# Patient Record
Sex: FEMALE | Race: WHITE | Employment: FULL TIME | URBAN - METROPOLITAN AREA
[De-identification: names, ages, dates, MRNs, and addresses within clinical notes are randomized per-mention and may not be internally consistent; named-entity substitution may affect disease eponyms.]

---

## 2017-03-13 PROBLEM — J06.9 URI, ACUTE: Status: ACTIVE | Noted: 2017-03-13

## 2017-03-13 PROBLEM — J01.00 ACUTE MAXILLARY SINUSITIS: Status: ACTIVE | Noted: 2017-03-13

## 2017-03-14 ENCOUNTER — HOSPITAL ENCOUNTER (OUTPATIENT)
Dept: GENERAL RADIOLOGY | Age: 52
Discharge: HOME | End: 2017-03-14
Payer: COMMERCIAL

## 2017-03-14 DIAGNOSIS — J06.9 URI, ACUTE: ICD-10-CM

## 2017-03-14 PROCEDURE — 71020 XR CHEST PA LAT: CPT

## 2018-08-30 ENCOUNTER — HOSPITAL ENCOUNTER (OUTPATIENT)
Age: 53
Setting detail: OBSERVATION
Discharge: HOME | End: 2018-09-01
Attending: EMERGENCY MEDICINE | Admitting: INTERNAL MEDICINE
Payer: COMMERCIAL

## 2018-08-30 ENCOUNTER — APPOINTMENT (OUTPATIENT)
Dept: GENERAL RADIOLOGY | Age: 53
End: 2018-08-30
Attending: EMERGENCY MEDICINE
Payer: COMMERCIAL

## 2018-08-30 ENCOUNTER — APPOINTMENT (OUTPATIENT)
Dept: CT IMAGING | Age: 53
End: 2018-08-30
Attending: EMERGENCY MEDICINE
Payer: COMMERCIAL

## 2018-08-30 DIAGNOSIS — N30.00 ACUTE CYSTITIS WITHOUT HEMATURIA: ICD-10-CM

## 2018-08-30 DIAGNOSIS — G93.40 ACUTE ENCEPHALOPATHY: Primary | ICD-10-CM

## 2018-08-30 LAB
ALBUMIN SERPL-MCNC: 3.4 G/DL (ref 3.5–5)
ALBUMIN/GLOB SERPL: 1.1 {RATIO} (ref 1.2–3.5)
ALP SERPL-CCNC: 74 U/L (ref 50–136)
ALT SERPL-CCNC: 25 U/L (ref 12–65)
ANION GAP SERPL CALC-SCNC: 10 MMOL/L (ref 7–16)
AST SERPL-CCNC: 16 U/L (ref 15–37)
BASOPHILS # BLD: 0 K/UL (ref 0–0.2)
BASOPHILS NFR BLD: 0 % (ref 0–2)
BILIRUB SERPL-MCNC: 0.3 MG/DL (ref 0.2–1.1)
BUN SERPL-MCNC: 24 MG/DL (ref 6–23)
CALCIUM SERPL-MCNC: 8.3 MG/DL (ref 8.3–10.4)
CHLORIDE SERPL-SCNC: 111 MMOL/L (ref 98–107)
CO2 SERPL-SCNC: 24 MMOL/L (ref 21–32)
CREAT SERPL-MCNC: 0.67 MG/DL (ref 0.6–1)
DIFFERENTIAL METHOD BLD: ABNORMAL
EOSINOPHIL # BLD: 0 K/UL (ref 0–0.8)
EOSINOPHIL NFR BLD: 0 % (ref 0.5–7.8)
ERYTHROCYTE [DISTWIDTH] IN BLOOD BY AUTOMATED COUNT: 13.2 %
GLOBULIN SER CALC-MCNC: 3.2 G/DL (ref 2.3–3.5)
GLUCOSE SERPL-MCNC: 125 MG/DL (ref 65–100)
HCT VFR BLD AUTO: 42.8 % (ref 35.8–46.3)
HGB BLD-MCNC: 14.2 G/DL (ref 11.7–15.4)
IMM GRANULOCYTES # BLD: 0 K/UL (ref 0–0.5)
IMM GRANULOCYTES NFR BLD AUTO: 0 % (ref 0–5)
LACTATE BLD-SCNC: 0.9 MMOL/L (ref 0.5–1.9)
LIPASE SERPL-CCNC: 157 U/L (ref 73–393)
LYMPHOCYTES # BLD: 0.6 K/UL (ref 0.5–4.6)
LYMPHOCYTES NFR BLD: 11 % (ref 13–44)
MAGNESIUM SERPL-MCNC: 2.3 MG/DL (ref 1.8–2.4)
MCH RBC QN AUTO: 30.9 PG (ref 26.1–32.9)
MCHC RBC AUTO-ENTMCNC: 33.2 G/DL (ref 31.4–35)
MCV RBC AUTO: 93 FL (ref 79.6–97.8)
MONOCYTES # BLD: 0.2 K/UL (ref 0.1–1.3)
MONOCYTES NFR BLD: 4 % (ref 4–12)
NEUTS SEG # BLD: 4.9 K/UL (ref 1.7–8.2)
NEUTS SEG NFR BLD: 85 % (ref 43–78)
NRBC # BLD: 0 K/UL (ref 0–0.2)
PLATELET # BLD AUTO: 170 K/UL (ref 150–450)
PMV BLD AUTO: 10.3 FL (ref 9.4–12.3)
POTASSIUM SERPL-SCNC: 4.2 MMOL/L (ref 3.5–5.1)
PROCALCITONIN SERPL-MCNC: <0.1 NG/ML
PROT SERPL-MCNC: 6.6 G/DL (ref 6.3–8.2)
RBC # BLD AUTO: 4.6 M/UL (ref 4.05–5.2)
SODIUM SERPL-SCNC: 145 MMOL/L (ref 136–145)
TROPONIN I BLD-MCNC: 0 NG/ML (ref 0.02–0.05)
WBC # BLD AUTO: 5.7 K/UL (ref 4.3–11.1)

## 2018-08-30 PROCEDURE — 99285 EMERGENCY DEPT VISIT HI MDM: CPT | Performed by: EMERGENCY MEDICINE

## 2018-08-30 PROCEDURE — 80307 DRUG TEST PRSMV CHEM ANLYZR: CPT

## 2018-08-30 PROCEDURE — 83605 ASSAY OF LACTIC ACID: CPT

## 2018-08-30 PROCEDURE — 80053 COMPREHEN METABOLIC PANEL: CPT

## 2018-08-30 PROCEDURE — 84145 PROCALCITONIN (PCT): CPT

## 2018-08-30 PROCEDURE — 71046 X-RAY EXAM CHEST 2 VIEWS: CPT

## 2018-08-30 PROCEDURE — 85025 COMPLETE CBC W/AUTO DIFF WBC: CPT

## 2018-08-30 PROCEDURE — 87086 URINE CULTURE/COLONY COUNT: CPT

## 2018-08-30 PROCEDURE — 93005 ELECTROCARDIOGRAM TRACING: CPT | Performed by: INTERNAL MEDICINE

## 2018-08-30 PROCEDURE — 84484 ASSAY OF TROPONIN QUANT: CPT

## 2018-08-30 PROCEDURE — 83735 ASSAY OF MAGNESIUM: CPT

## 2018-08-30 PROCEDURE — 70450 CT HEAD/BRAIN W/O DYE: CPT

## 2018-08-30 PROCEDURE — 81003 URINALYSIS AUTO W/O SCOPE: CPT

## 2018-08-30 PROCEDURE — 83690 ASSAY OF LIPASE: CPT

## 2018-08-30 PROCEDURE — 81015 MICROSCOPIC EXAM OF URINE: CPT

## 2018-08-30 NOTE — IP AVS SNAPSHOT
[address] [facility] 
 
 
 [address] [facility] 322 [facility] 
[phone] Patient: Corky Campbell MRN: [id number] :1965 About your hospitalization You were admitted on:  2018 You last received care in the:  [facility] 7 MED SURG You were discharged on:  2018 Why you were hospitalized Your primary diagnosis was: Altered Mental Status Your diagnoses also included:  Depression, Vasovagal Near Syncope Follow-up Information Follow up With Details Comments Contact Info Renzo Vidal DO  please call on monday and schedule a hospital follow up to be seen in about a week. [address] [facility] 
[phone] Discharge Orders None A check marcellus indicates which time of day the medication should be taken. My Medications CONTINUE taking these medications Instructions Each Dose to Equal  
 Morning Noon Evening Bedtime  
 albuterol 90 mcg/actuation inhaler Commonly known as:  PROVENTIL HFA, VENTOLIN HFA, PROAIR HFA Your next dose is: Take on as needed schedule Take 1 Puff by inhalation every four (4) hours as needed for Wheezing. 1 Puff CLARITIN 10 mg tablet Generic drug:  loratadine Your next dose is: Take as directed Take 10 mg by mouth. 10 mg  
    
   
   
   
  
 ergocalciferol 50,000 unit capsule Commonly known as:  ERGOCALCIFEROL Your next dose is: Take as directed Take 1 Cap by mouth every seven (7) days. [address] Units  
    
   
   
   
  
 sertraline 100 mg tablet Commonly known as:  ZOLOFT Your next dose is: Take as directed Take 1 tablet twice daily Discharge Instructions Altered Mental Status: Care Instructions Your Care Instructions Altered mental status is a change in how well your brain is working.  As a result, you may be confused, be less alert than usual, or act in odd ways. This may include seeing or hearing things that aren't really there (hallucinations). A mental status change has many possible causes. For example, it may be the result of an infection, an imbalance of chemicals in the body, or a chronic disease such as diabetes or COPD. It can also be caused by things such as a head injury, taking certain medicines, or using alcohol or drugs. The doctor may do tests to look for the cause. These tests may include urine tests, blood tests, and imaging tests such as a CT scan. Sometimes a clear cause isn't found. But tests can help the doctor rule out a serious cause of your symptoms. A change in mental status can be scary. But mental status will often return to normal when the cause is treated. So it is important to get any follow-up testing or treatment the doctor has suggested. The doctor has checked you carefully, but problems can develop later. If you notice any problems or new symptoms, get medical treatment right away. Follow-up care is a key part of your treatment and safety. Be sure to make and go to all appointments, and call your doctor if you are having problems. It's also a good idea to know your test results and keep a list of the medicines you take. How can you care for yourself at home? · Be safe with medicines. Take your medicines exactly as prescribed. Call your doctor if you think you are having a problem with your medicine. · Have another adult stay with you until you are better. This can help keep you safe. Ask that person to watch for signs that your mental status is getting worse. When should you call for help? Call 911 anytime you think you may need emergency care. For example, call if: 
  · You passed out (lost consciousness).  
 Call your doctor now or seek immediate medical care if: 
  · Your mental status is getting worse.   · You have new symptoms, such as a fever, chills, or shortness of breath.  
  · You do not feel safe.  
 Watch closely for changes in your health, and be sure to contact your doctor if: 
  · You do not get better as expected. Where can you learn more? Go to http://filemon-vanessa.info/. Enter B818 in the search box to learn more about \"Altered Mental Status: Care Instructions. \" Current as of: October 9, 2017 Content Version: 11.7 © 8590-9902 [employer]. Care instructions adapted under license by [employer] (which disclaims liability or warranty for this information). If you have questions about a medical condition or this instruction, always ask your healthcare professional. [address] any warranty or liability for your use of this information. Lightheadedness or Faintness: Care Instructions Your Care Instructions Lightheadedness is a feeling that you are about to faint or \"pass out. \" You do not feel as if you or your surroundings are moving. It is different from vertigo, which is the feeling that you or things around you are spinning or tilting. Lightheadedness usually goes away or gets better when you lie down. If lightheadedness gets worse, it can lead to a fainting spell. It is common to feel lightheaded from time to time. Lightheadedness usually is not caused by a serious problem. It often is caused by a short-lasting drop in blood pressure and blood flow to your head that occurs when you get up too quickly from a seated or lying position. Follow-up care is a key part of your treatment and safety. Be sure to make and go to all appointments, and call your doctor if you are having problems. It's also a good idea to know your test results and keep a list of the medicines you take. How can you care for yourself at home? · Lie down for 1 or 2 minutes when you feel lightheaded.  After lying down, sit up slowly and remain sitting for 1 to 2 minutes before slowly standing up. · Avoid movements, positions, or activities that have made you lightheaded in the past. 
· Get plenty of rest, especially if you have a cold or flu, which can cause lightheadedness. · Make sure you drink plenty of fluids, especially if you have a fever or have been sweating. · Do not drive or put yourself and others in danger while you feel lightheaded. When should you call for help? Call 911 anytime you think you may need emergency care. For example, call if: 
  · You have symptoms of a stroke. These may include: 
¨ Sudden numbness, tingling, weakness, or loss of movement in your face, arm, or leg, especially on only one side of your body. ¨ Sudden vision changes. ¨ Sudden trouble speaking. ¨ Sudden confusion or trouble understanding simple statements. ¨ Sudden problems with walking or balance. ¨ A sudden, severe headache that is different from past headaches.  
  · You have symptoms of a heart attack. These may include: ¨ Chest pain or pressure, or a strange feeling in the chest. 
¨ Sweating. ¨ Shortness of breath. ¨ Nausea or vomiting. ¨ Pain, pressure, or a strange feeling in the back, neck, jaw, or upper belly or in one or both shoulders or arms. ¨ Lightheadedness or sudden weakness. ¨ A fast or irregular heartbeat. After you call 911, the  may tell you to chew 1 adult-strength or 2 to 4 low-dose aspirin. Wait for an ambulance. Do not try to drive yourself.  
 Watch closely for changes in your health, and be sure to contact your doctor if: 
  · Your lightheadedness gets worse or does not get better with home care. Where can you learn more? Go to http://filemon-vanessa.info/. Enter C595 in the search box to learn more about \"Lightheadedness or Faintness: Care Instructions. \" Current as of: November 20, 2017 Content Version: 11.7 © 7433-5536 Healthwise, [employer]. Care instructions adapted under license by [employer] (which disclaims liability or warranty for this information). If you have questions about a medical condition or this instruction, always ask your healthcare professional. [address] any warranty or liability for your use of this information. DISCHARGE SUMMARY from Nurse PATIENT INSTRUCTIONS: 
 
 
F-face looks uneven A-arms unable to move or move unevenly S-speech slurred or non-existent T-time-call 911 as soon as signs and symptoms begin-DO NOT go Back to bed or wait to see if you get better-TIME IS BRAIN. Warning Signs of HEART ATTACK Call 911 if you have these symptoms: 
? Chest discomfort. Most heart attacks involve discomfort in the center of the chest that lasts more than a few minutes, or that goes away and comes back. It can feel like uncomfortable pressure, squeezing, fullness, or pain. ? Discomfort in other areas of the upper body. Symptoms can include pain or discomfort in one or both arms, the back, neck, jaw, or stomach. ? Shortness of breath with or without chest discomfort. ? Other signs may include breaking out in a cold sweat, nausea, or lightheadedness. Don't wait more than five minutes to call [address]! Fast action can save your life. Calling 911 is almost always the fastest way to get lifesaving treatment. Emergency Medical Services staff can begin treatment when they arrive  up to an hour sooner than if someone gets to the hospital by car. The discharge information has been reviewed with the patient.   The patient verbalized understanding. Discharge medications reviewed with the patient and appropriate educational materials and side effects teaching were provided. ___________________________________________________________________________________________________________________________________ MyChart Announcement We are excited to announce that we are making your provider's discharge notes available to you in TweetUp. You will see these notes when they are completed and signed by the physician that discharged you from your recent hospital stay. If you have any questions or concerns about any information you see in unrivalt, please call the Health Information Department where you were seen or reach out to your Primary Care Provider for more information about your plan of care. Introducing [facility]! [facility] introduces TweetUp patient portal. Now you can access parts of your medical record, email your doctor's office, and request medication refills online. 1. In your internet browser, go to [email or web address] 2. Click on the First Time User? Click Here link in the Sign In box. You will see the New Member Sign Up page. 3. Enter your TweetUp Access Code exactly as it appears below. You will not need to use this code after youve completed the sign-up process. If you do not sign up before the expiration date, you must request a new code. · TweetUp Access Code: [email or web address] Expires: 11/8/2018 11:53 AM 
 
4. Enter the last four digits of your Social Security Number (xxxx) and Date of Birth (mm/dd/yyyy) as indicated and click Submit. You will be taken to the next sign-up page. 5. Create a unrivalt ID. This will be your TweetUp login ID and cannot be changed, so think of one that is secure and easy to remember. 6. Create a TweetUp password. You can change your password at any time. 7. Enter your Password Reset Question and Answer.  This can be used at a later time if you forget your password. 8. Enter your e-mail address. You will receive e-mail notification when new information is available in [address] 9. Click Sign Up. You can now view and download portions of your medical record. 10. Click the Download Summary menu link to download a portable copy of your medical information. If you have questions, please visit the Frequently Asked Questions section of the [employer]t website. Remember, Leads Direct is NOT to be used for urgent needs. For medical emergencies, dial 911. Now available from your iPhone and Android! Introducing Tay Costa As a [employer] patient, I wanted to make you aware of our electronic visit tool called Tay Costa. [employer] 24/7 allows you to connect within minutes with a medical provider 24 hours a day, seven days a week via a mobile device or tablet or logging into a secure website from your computer. You can access Tay Costa from anywhere in the [place]. A virtual visit might be right for you when you have a simple condition and feel like you just dont want to get out of bed, or cant get away from work for an appointment, when your regular [employer] provider is not available (evenings, weekends or holidays), or when youre out of town and need minor care. Electronic visits cost only $49 and if the [employer] 24/7 provider determines a prescription is needed to treat your condition, one can be electronically transmitted to a nearby pharmacy*. Please take a moment to enroll today if you have not already done so. The enrollment process is free and takes just a few minutes. To enroll, please download the [employer] 24/7 jessica to your tablet or phone, or visit [email or web address] to enroll on your computer.    
And, as an [address] [place] [address] patient with a BeOnDesk account, the results of your visits will be scanned into your electronic medical record and your primary care provider will be able to view the scanned results. We urge you to continue to see your regular [facility] provider for your ongoing medical care. And while your primary care provider may not be the one available when you seek a CallRestopjfin virtual visit, the peace of mind you get from getting a real diagnosis real time can be priceless. For more information on ValetAnywhere, view our Frequently Asked Questions (FAQs) at [email or web address]. Sincerely, 
 
Mireya Dowell MD 
Chief Medical Officer 50Gabriela Lawson *:  certain medications cannot be prescribed via ValetAnywhere Unresulted Labs-Please follow up with your PCP about these lab tests Order Current Status CULTURE, BLOOD Preliminary result CULTURE, BLOOD Preliminary result CULTURE, URINE Preliminary result Providers Seen During Your Hospitalization Provider Specialty Primary office phone Nguyễn Hays MD Emergency Medicine [phone] Tre Tyson MD Internal Medicine [phone] Your Primary Care Physician (PCP) Primary Care Physician Office Phone Office Fax Abeljose Jez [phone] You are allergic to the following No active allergies Recent Documentation Height Weight BMI OB Status Smoking Status 1.575 m 90.7 kg 36.58 kg/m2 Perimenopausal Never Smoker Emergency Contacts Name Discharge Info Relation Home Work Mobile Luigi Menard  Spouse [3] [phone] Patient Belongings The following personal items are in your possession at time of discharge: 
     Visual Aid: None Discharge Instructions Attachments/References VASOVAGAL SYNCOPE (ENGLISH) Patient Handouts Vasovagal Syncope: Care Instructions Your Care Instructions Vasovagal syncope (say \"yeh-per-WUG-gul MYGC-zhi-eht\")is sudden dizziness or fainting that can be set off by things such as pain, stress, fear, or trauma. You may sweat or feel lightheaded, sick to your stomach, or tingly. The problem causes the heart rate to slow and the blood vessels to widen, or dilate, for a short time. When this happens, blood pools in the lower body, and less blood goes to the brain. You can usually get relief by lying down with your legs raised (elevated). This helps more blood to flow to your brain and may help relieve symptoms like feeling dizzy. Some doctors may recommend a technique that involves tensing your fists and arms. This type of fainting is often easy to predict. For example, it happens to some people when they see blood or have to get a shot. They may feel symptoms before they faint. An episode of vasovagal syncope usually responds well to self-care. Other treatment often isn't needed. But if the fainting keeps happening, your doctor may suggest further treatments. Follow-up care is a key part of your treatment and safety. Be sure to make and go to all appointments, and call your doctor if you are having problems. It's also a good idea to know your test results and keep a list of the medicines you take. How can you care for yourself at home? · Drink plenty of fluids to prevent dehydration. If you have kidney, heart, or liver disease and have to limit fluids, talk with your doctor before you increase your fluid intake. · Try to avoid things that you think may set off vasovagal syncope. · Talk to your doctor about any medicines you take. Some medicines may increase the chance of this condition occurring. · If you feel symptoms, lie down with your legs raised. Talk to your doctor about what to do if your symptoms come back. When should you call for help? Call 911 anytime you think you may need emergency care. For example, call if: 
  · You have symptoms of a heart problem. These may include: ¨ Chest pain or pressure. ¨ Severe trouble breathing. ¨ A fast or irregular heartbeat.  
 Watch closely for changes in your health, and be sure to contact your doctor if: 
  · You have more episodes of fainting at home.  
  · You do not get better as expected. Where can you learn more? Go to http://filemon-vanessa.info/. Enter L754 in the search box to learn more about \"Vasovagal Syncope: Care Instructions. \" Current as of: November 20, 2017 Content Version: 11.7 © 8274-4089 Healthwise, Incorporated. Care instructions adapted under license by [employer] (which disclaims liability or warranty for this information). If you have questions about a medical condition or this instruction, always ask your healthcare professional. [address] any warranty or liability for your use of this information. Please provide this summary of care documentation to your next provider. Signatures-by signing, you are acknowledging that this After Visit Summary has been reviewed with you and you have received a copy. Patient Signature:  ____________________________________________________________ Date:  ____________________________________________________________  
  
Tammy Mora Provider Signature:  ____________________________________________________________ Date:  ____________________________________________________________

## 2018-08-31 ENCOUNTER — APPOINTMENT (OUTPATIENT)
Dept: MRI IMAGING | Age: 53
End: 2018-08-31
Attending: INTERNAL MEDICINE
Payer: COMMERCIAL

## 2018-08-31 ENCOUNTER — APPOINTMENT (OUTPATIENT)
Dept: ULTRASOUND IMAGING | Age: 53
End: 2018-08-31
Attending: INTERNAL MEDICINE
Payer: COMMERCIAL

## 2018-08-31 PROBLEM — R41.82 ALTERED MENTAL STATUS: Status: ACTIVE | Noted: 2018-08-31

## 2018-08-31 LAB
ALBUMIN SERPL-MCNC: 3.2 G/DL (ref 3.5–5)
ALBUMIN/GLOB SERPL: 1.1 {RATIO} (ref 1.2–3.5)
ALP SERPL-CCNC: 67 U/L (ref 50–136)
ALT SERPL-CCNC: 22 U/L (ref 12–65)
AMMONIA PLAS-SCNC: 35 UMOL/L (ref 11–32)
AMPHET UR QL SCN: NEGATIVE
ANION GAP SERPL CALC-SCNC: 10 MMOL/L (ref 7–16)
APPEARANCE UR: CLEAR
AST SERPL-CCNC: 11 U/L (ref 15–37)
ATRIAL RATE: 51 BPM
BACTERIA URNS QL MICRO: 0 /HPF
BARBITURATES UR QL SCN: NEGATIVE
BENZODIAZ UR QL: NEGATIVE
BILIRUB SERPL-MCNC: 0.3 MG/DL (ref 0.2–1.1)
BILIRUB UR QL: NEGATIVE
BUN SERPL-MCNC: 21 MG/DL (ref 6–23)
CALCIUM SERPL-MCNC: 8.3 MG/DL (ref 8.3–10.4)
CALCULATED P AXIS, ECG09: 55 DEGREES
CALCULATED R AXIS, ECG10: 21 DEGREES
CALCULATED T AXIS, ECG11: 39 DEGREES
CANNABINOIDS UR QL SCN: NEGATIVE
CASTS URNS QL MICRO: 0 /LPF
CHLORIDE SERPL-SCNC: 112 MMOL/L (ref 98–107)
CHOLEST SERPL-MCNC: 218 MG/DL
CO2 SERPL-SCNC: 24 MMOL/L (ref 21–32)
COCAINE UR QL SCN: NEGATIVE
COLOR UR: YELLOW
CREAT SERPL-MCNC: 0.49 MG/DL (ref 0.6–1)
CRYSTALS URNS QL MICRO: 0 /LPF
DIAGNOSIS, 93000: NORMAL
EPI CELLS #/AREA URNS HPF: 0 /HPF
EST. AVERAGE GLUCOSE BLD GHB EST-MCNC: 100 MG/DL
ETHANOL SERPL-MCNC: <3 MG/DL
FOLATE SERPL-MCNC: 29.5 NG/ML (ref 3.1–17.5)
GLOBULIN SER CALC-MCNC: 3 G/DL (ref 2.3–3.5)
GLUCOSE BLD STRIP.AUTO-MCNC: 77 MG/DL (ref 65–100)
GLUCOSE SERPL-MCNC: 97 MG/DL (ref 65–100)
GLUCOSE UR STRIP.AUTO-MCNC: NEGATIVE MG/DL
HBA1C MFR BLD: 5.1 % (ref 4.8–6)
HDLC SERPL-MCNC: 68 MG/DL (ref 40–60)
HDLC SERPL: 3.2 {RATIO}
HGB UR QL STRIP: NO GROWTH
KETONES UR QL STRIP.AUTO: ABNORMAL MG/DL
LDLC SERPL CALC-MCNC: 137.2 MG/DL
LEUKOCYTE ESTERASE UR QL STRIP.AUTO: ABNORMAL
LIPID PROFILE,FLP: ABNORMAL
METHADONE UR QL: NEGATIVE
MUCOUS THREADS URNS QL MICRO: 0 /LPF
NITRITE UR QL STRIP.AUTO: NEGATIVE
OPIATES UR QL: NEGATIVE
P-R INTERVAL, ECG05: 126 MS
PCP UR QL: NEGATIVE
PH UR STRIP: 5.5 [PH] (ref 5–9)
POTASSIUM SERPL-SCNC: 3.5 MMOL/L (ref 3.5–5.1)
PROT SERPL-MCNC: 6.2 G/DL (ref 6.3–8.2)
PROT UR STRIP-MCNC: NEGATIVE MG/DL
Q-T INTERVAL, ECG07: 482 MS
QRS DURATION, ECG06: 84 MS
QTC CALCULATION (BEZET), ECG08: 444 MS
RBC #/AREA URNS HPF: 0 /HPF
SODIUM SERPL-SCNC: 146 MMOL/L (ref 136–145)
SP GR UR REFRACTOMETRY: 1.03 (ref 1–1.02)
TRIGL SERPL-MCNC: 64 MG/DL (ref 35–150)
TSH SERPL DL<=0.005 MIU/L-ACNC: 0.32 UIU/ML (ref 0.36–3.74)
UROBILINOGEN UR QL STRIP.AUTO: 0.2 EU/DL (ref 0.2–1)
VENTRICULAR RATE, ECG03: 51 BPM
VIT B12 SERPL-MCNC: 536 PG/ML (ref 193–986)
VLDLC SERPL CALC-MCNC: 12.8 MG/DL (ref 6–23)
WBC URNS QL MICRO: NORMAL /HPF

## 2018-08-31 PROCEDURE — 87040 BLOOD CULTURE FOR BACTERIA: CPT

## 2018-08-31 PROCEDURE — 80061 LIPID PANEL: CPT

## 2018-08-31 PROCEDURE — 36415 COLL VENOUS BLD VENIPUNCTURE: CPT

## 2018-08-31 PROCEDURE — 82962 GLUCOSE BLOOD TEST: CPT

## 2018-08-31 PROCEDURE — 93306 TTE W/DOPPLER COMPLETE: CPT

## 2018-08-31 PROCEDURE — 82746 ASSAY OF FOLIC ACID SERUM: CPT

## 2018-08-31 PROCEDURE — 82607 VITAMIN B-12: CPT

## 2018-08-31 PROCEDURE — 80053 COMPREHEN METABOLIC PANEL: CPT

## 2018-08-31 PROCEDURE — 74011250637 HC RX REV CODE- 250/637: Performed by: INTERNAL MEDICINE

## 2018-08-31 PROCEDURE — 77030032490 HC SLV COMPR SCD KNE COVD -B

## 2018-08-31 PROCEDURE — 74011000258 HC RX REV CODE- 258: Performed by: EMERGENCY MEDICINE

## 2018-08-31 PROCEDURE — 84443 ASSAY THYROID STIM HORMONE: CPT

## 2018-08-31 PROCEDURE — 93880 EXTRACRANIAL BILAT STUDY: CPT

## 2018-08-31 PROCEDURE — 82140 ASSAY OF AMMONIA: CPT

## 2018-08-31 PROCEDURE — 70551 MRI BRAIN STEM W/O DYE: CPT

## 2018-08-31 PROCEDURE — 74011250636 HC RX REV CODE- 250/636: Performed by: INTERNAL MEDICINE

## 2018-08-31 PROCEDURE — 99218 HC RM OBSERVATION: CPT

## 2018-08-31 PROCEDURE — 96365 THER/PROPH/DIAG IV INF INIT: CPT | Performed by: EMERGENCY MEDICINE

## 2018-08-31 PROCEDURE — 97161 PT EVAL LOW COMPLEX 20 MIN: CPT

## 2018-08-31 PROCEDURE — 74011250636 HC RX REV CODE- 250/636: Performed by: EMERGENCY MEDICINE

## 2018-08-31 PROCEDURE — 74011000258 HC RX REV CODE- 258: Performed by: INTERNAL MEDICINE

## 2018-08-31 PROCEDURE — 77030020263 HC SOL INJ SOD CL0.9% LFCR 1000ML

## 2018-08-31 PROCEDURE — 83036 HEMOGLOBIN GLYCOSYLATED A1C: CPT

## 2018-08-31 RX ORDER — ASPIRIN 325 MG
325 TABLET ORAL DAILY
Status: DISCONTINUED | OUTPATIENT
Start: 2018-08-31 | End: 2018-09-01 | Stop reason: HOSPADM

## 2018-08-31 RX ORDER — SERTRALINE HYDROCHLORIDE 100 MG/1
100 TABLET, FILM COATED ORAL 2 TIMES DAILY
Status: DISCONTINUED | OUTPATIENT
Start: 2018-08-31 | End: 2018-09-01 | Stop reason: HOSPADM

## 2018-08-31 RX ORDER — SODIUM CHLORIDE 0.9 % (FLUSH) 0.9 %
5-10 SYRINGE (ML) INJECTION EVERY 8 HOURS
Status: DISCONTINUED | OUTPATIENT
Start: 2018-08-31 | End: 2018-09-01 | Stop reason: HOSPADM

## 2018-08-31 RX ORDER — HEPARIN SODIUM 5000 [USP'U]/ML
5000 INJECTION, SOLUTION INTRAVENOUS; SUBCUTANEOUS EVERY 8 HOURS
Status: DISCONTINUED | OUTPATIENT
Start: 2018-08-31 | End: 2018-09-01 | Stop reason: HOSPADM

## 2018-08-31 RX ORDER — CEFTRIAXONE 1 G/1
INJECTION, POWDER, FOR SOLUTION INTRAMUSCULAR; INTRAVENOUS
Status: ACTIVE
Start: 2018-08-31 | End: 2018-08-31

## 2018-08-31 RX ORDER — SODIUM CHLORIDE 9 MG/ML
125 INJECTION, SOLUTION INTRAVENOUS CONTINUOUS
Status: DISCONTINUED | OUTPATIENT
Start: 2018-08-31 | End: 2018-09-01 | Stop reason: HOSPADM

## 2018-08-31 RX ORDER — SODIUM CHLORIDE 0.9 % (FLUSH) 0.9 %
5-10 SYRINGE (ML) INJECTION AS NEEDED
Status: DISCONTINUED | OUTPATIENT
Start: 2018-08-31 | End: 2018-09-01 | Stop reason: HOSPADM

## 2018-08-31 RX ORDER — SODIUM CHLORIDE 900 MG/100ML
INJECTION INTRAVENOUS
Status: ACTIVE
Start: 2018-08-31 | End: 2018-08-31

## 2018-08-31 RX ORDER — ATORVASTATIN CALCIUM 40 MG/1
40 TABLET, FILM COATED ORAL
Status: DISCONTINUED | OUTPATIENT
Start: 2018-08-31 | End: 2018-09-01 | Stop reason: HOSPADM

## 2018-08-31 RX ORDER — ALBUTEROL SULFATE 0.83 MG/ML
2.5 SOLUTION RESPIRATORY (INHALATION)
Status: DISCONTINUED | OUTPATIENT
Start: 2018-08-31 | End: 2018-09-01 | Stop reason: HOSPADM

## 2018-08-31 RX ORDER — SERTRALINE HYDROCHLORIDE 100 MG/1
100 TABLET, FILM COATED ORAL DAILY
Status: DISCONTINUED | OUTPATIENT
Start: 2018-08-31 | End: 2018-08-31

## 2018-08-31 RX ADMIN — SODIUM CHLORIDE 125 ML/HR: 900 INJECTION, SOLUTION INTRAVENOUS at 21:22

## 2018-08-31 RX ADMIN — SODIUM CHLORIDE 125 ML/HR: 900 INJECTION, SOLUTION INTRAVENOUS at 05:42

## 2018-08-31 RX ADMIN — SERTRALINE HYDROCHLORIDE 100 MG: 100 TABLET ORAL at 09:59

## 2018-08-31 RX ADMIN — Medication 10 ML: at 05:42

## 2018-08-31 RX ADMIN — SERTRALINE HYDROCHLORIDE 100 MG: 100 TABLET ORAL at 21:21

## 2018-08-31 RX ADMIN — Medication 5 ML: at 14:00

## 2018-08-31 RX ADMIN — ATORVASTATIN CALCIUM 40 MG: 40 TABLET, FILM COATED ORAL at 21:21

## 2018-08-31 RX ADMIN — ASPIRIN 325 MG ORAL TABLET 325 MG: 325 PILL ORAL at 09:58

## 2018-08-31 RX ADMIN — CEFTRIAXONE 1 G: 1 INJECTION, POWDER, FOR SOLUTION INTRAMUSCULAR; INTRAVENOUS at 23:42

## 2018-08-31 RX ADMIN — HEPARIN SODIUM 5000 UNITS: 5000 INJECTION INTRAVENOUS; SUBCUTANEOUS at 05:42

## 2018-08-31 RX ADMIN — CEFTRIAXONE SODIUM 1 G: 1 INJECTION, POWDER, FOR SOLUTION INTRAMUSCULAR; INTRAVENOUS at 01:37

## 2018-08-31 NOTE — PROGRESS NOTES
PT Note: PT orders received/reviewed and evaluation attempted. Patient was off the floor. Evaluation will be re-attempted as schedule and patient's status allow. Thanks, Robert Gallegos, PT, DPT

## 2018-08-31 NOTE — INTERDISCIPLINARY ROUNDS
Interdisciplinary team rounds were held 8/31/2018 with the following team members:Care Management, Nursing, Occupational Therapy and Physician and the patient. Plan of care discussed. See clinical pathway and/or care plan for interventions and desired outcomes. DC today pending echo results.

## 2018-08-31 NOTE — PROGRESS NOTES
Problem: Falls - Risk of 
Goal: *Absence of Falls Document Mehnaz Cochran Fall Risk and appropriate interventions in the flowsheet. Outcome: Progressing Towards Goal 
Fall Risk Interventions: 
Mobility Interventions: Patient to call before getting OOB, PT Consult for mobility concerns Mentation Interventions: Adequate sleep, hydration, pain control Elimination Interventions: Call light in reach, Patient to call for help with toileting needs

## 2018-08-31 NOTE — PROGRESS NOTES
TRANSFER - IN REPORT: 
 
Verbal report received from Kamlesh Barbosa RN on Ly Lamas  being received from ED (unit) for routine progression of care Report consisted of patients Situation, Background, Assessment and  
Recommendations(SBAR). Information from the following report(s) SBAR, ED Summary, [facility] and Recent Results was reviewed with the receiving nurse. Opportunity for questions and clarification was provided. Assessment completed upon patients arrival to unit and care assumed.

## 2018-08-31 NOTE — PROGRESS NOTES
Problem: Falls - Risk of 
Goal: *Absence of Falls Document Cristina Chamorro Fall Risk and appropriate interventions in the flowsheet. Outcome: Progressing Towards Goal 
Fall Risk Interventions: 
Mobility Interventions: Patient to call before getting OOB, PT Consult for mobility concerns, PT Consult for assist device competence Mentation Interventions: Adequate sleep, hydration, pain control, Bed/chair exit alarm, Door open when patient unattended Elimination Interventions: Bed/chair exit alarm, Call light in reach

## 2018-08-31 NOTE — PROGRESS NOTES
Changed Zoloft 100 mg dose to Twice Daily as patient takes it at home. Ok to change order per Dr. Jessica Johnson.

## 2018-08-31 NOTE — ROUTINE PROCESS
TRANSFER - OUT REPORT: 
 
Verbal report given to Aisha Mcdonough RN on Kody Elkins  being transferred to [id number] for routine progression of care Report consisted of patients Situation, Background, Assessment and  
Recommendations(SBAR). Information from the following report(s) SBAR, ED Summary, MAR, Recent Results and Cardiac Rhythm Sinus bradycardia was reviewed with the receiving nurse. Lines:  
Peripheral IV 08/30/18 Left Antecubital (Active) Site Assessment Clean, dry, & intact 8/30/2018 10:26 PM  
Dressing Status Clean, dry, & intact 8/30/2018 10:26 PM  
  
 
Opportunity for questions and clarification was provided. Patient transported with: 
 [employer]

## 2018-08-31 NOTE — PROGRESS NOTES
Jeri Link visited with patient. Prashanth Tracy,  Staff  C: [phone]  /  [email or web address]

## 2018-08-31 NOTE — H&P
Hospitalist H&P/Consult Note Admit Date:  2018 10:24 PM  
Name:  Davide Rogers Age:  52 y.o. 
:  1965 MRN:  [id number] PCP:  Kimberli Beltran DO Treatment Team: Attending Provider: Trevon Miranda MD; Primary Nurse: Caity Manley RN 
 
HPI:  
52years old F with PMH of depression, anxiety was brought to the ED by her  due to AMS at work. Patient noted she works at [employer], around 2:00 PM she was very busy. Patient was sweating a lot and having dizziness. Patient described dizziness as lightheadedness worse when she was trying to stand up from her desk. Patient also reported having nausea and vomiting several times. Patient noted by the end of the day she was having generalized weakness and she was unable to stand up by herself.  stated she was confused and weak at work.  noted she improved while in the the ED. Patient denies diarrhea, abdominal pain, dysuria, history of vertigo, focal weakness, SOB. In the ED CT brain did not reveal any acute disease. UA with few WBC. 10 systems reviewed and negative except as noted in HPI. Past Medical History:  
Diagnosis Date  Contact dermatitis and other eczema, due to unspecified cause  Depression Past Surgical History:  
Procedure Laterality Date  HX  SECTION    
 HX TONSIL AND ADENOIDECTOMY  HX TUBAL LIGATION    
 HX WISDOM TEETH EXTRACTION Prior to Admission Medications Prescriptions Last Dose Informant Patient Reported? Taking? albuterol (PROVENTIL HFA, VENTOLIN HFA, PROAIR HFA) 90 mcg/actuation inhaler   No No  
Sig: Take 1 Puff by inhalation every four (4) hours as needed for Wheezing.  
ergocalciferol (ERGOCALCIFEROL) 50,000 unit capsule   No No  
Sig: Take 1 Cap by mouth every seven (7) days. loratadine (CLARITIN) 10 mg tablet   Yes No  
Sig: Take 10 mg by mouth. sertraline (ZOLOFT) 100 mg tablet   No No  
Sig: Take 1 tablet twice daily Facility-Administered Medications: None No Known Allergies Social History Substance Use Topics  Smoking status: Never Smoker  Smokeless tobacco: Never Used  Alcohol use 0.0 oz/week  
  0 Standard drinks or equivalent per week Comment: occ Family History Problem Relation Age of Onset  Cancer Mother  Alzheimer Father Immunization History Administered Date(s) Administered  Influenza Vaccine 10/22/2017 Objective:  
 
Patient Vitals for the past 24 hrs: 
 Temp Pulse Resp BP SpO2  
08/31/18 0202 97.8 °F (36.6 °C) - - 118/69 95 % 08/31/18 0122 - - - 110/63 95 % 08/31/18 0102 - - - 134/73 95 % 08/31/18 0022 - - - 111/68 94 % 08/31/18 0002 - - - 118/70 94 % 08/30/18 2342 - - - 129/79 95 % 08/30/18 2322 - - - 123/76 98 % 08/30/18 2246 - (!) 56 - 108/74 97 % 08/30/18 2237 - (!) 58 - 121/78 99 % 08/30/18 2227 - 63 16 119/76 100 % Oxygen Therapy O2 Sat (%): 95 % (08/31/18 0202) Pulse via Oximetry: 59 beats per minute (08/31/18 0202) No intake or output data in the 24 hours ending 08/31/18 0207 Physical Exam: 
General:    Well nourished. Alert. Eyes:   Normal sclera. Extraocular movements intact. ENT:  Normocephalic, atraumatic. Dry mucous membranes CV:   RRR. No murmur, rub, or gallop. Lungs:  CTAB. No wheezing, rhonchi, or rales. Abdomen: Soft, nontender, nondistended. Bowel sounds normal.  
Extremities: Warm and dry. No cyanosis or edema. Neurologic: CN II-XII grossly intact. Sensation intact. Strength 5/5 upper and lower extremities. AAO x3. Finger to nose test negative Skin:     No rashes or jaundice. No wounds. Psych:  Normal mood and affect. I reviewed the labs  and other studies done this admission. Data Review:  
Recent Results (from the past 24 hour(s)) CBC WITH AUTOMATED DIFF Collection Time: 08/30/18 10:43 PM  
Result Value Ref Range WBC 5.7 4.3 - 11.1 K/uL  
 RBC 4.60 4.05 - 5.2 M/uL HGB 14.2 11.7 - 15.4 g/dL HCT 42.8 35.8 - 46.3 % MCV 93.0 79.6 - 97.8 FL  
 MCH 30.9 26.1 - 32.9 PG  
 MCHC 33.2 31.4 - 35.0 g/dL  
 RDW 13.2 % PLATELET 080 630 - 117 K/uL MPV 10.3 9.4 - 12.3 FL ABSOLUTE NRBC 0.00 0.0 - 0.2 K/uL  
 DF AUTOMATED NEUTROPHILS 85 (H) 43 - 78 % LYMPHOCYTES 11 (L) 13 - 44 % MONOCYTES 4 4.0 - 12.0 % EOSINOPHILS 0 (L) 0.5 - 7.8 % BASOPHILS 0 0.0 - 2.0 % IMMATURE GRANULOCYTES 0 0.0 - 5.0 %  
 ABS. NEUTROPHILS 4.9 1.7 - 8.2 K/UL  
 ABS. LYMPHOCYTES 0.6 0.5 - 4.6 K/UL  
 ABS. MONOCYTES 0.2 0.1 - 1.3 K/UL  
 ABS. EOSINOPHILS 0.0 0.0 - 0.8 K/UL  
 ABS. BASOPHILS 0.0 0.0 - 0.2 K/UL  
 ABS. IMM. GRANS. 0.0 0.0 - 0.5 K/UL METABOLIC PANEL, COMPREHENSIVE Collection Time: 08/30/18 10:43 PM  
Result Value Ref Range Sodium 145 136 - 145 mmol/L Potassium 4.2 3.5 - 5.1 mmol/L Chloride 111 (H) 98 - 107 mmol/L  
 CO2 24 21 - 32 mmol/L Anion gap 10 7 - 16 mmol/L Glucose 125 (H) 65 - 100 mg/dL BUN 24 (H) 6 - 23 MG/DL Creatinine 0.67 0.6 - 1.0 MG/DL  
 GFR est AA >60 >60 ml/min/1.73m2 GFR est non-AA >60 >60 ml/min/1.73m2 Calcium 8.3 8.3 - 10.4 MG/DL Bilirubin, total 0.3 0.2 - 1.1 MG/DL  
 ALT (SGPT) 25 12 - 65 U/L  
 AST (SGOT) 16 15 - 37 U/L Alk. phosphatase 74 50 - 136 U/L Protein, total 6.6 6.3 - 8.2 g/dL Albumin 3.4 (L) 3.5 - 5.0 g/dL Globulin 3.2 2.3 - 3.5 g/dL A-G Ratio 1.1 (L) 1.2 - 3.5 PROCALCITONIN Collection Time: 08/30/18 10:43 PM  
Result Value Ref Range Procalcitonin <0.1 ng/mL ETHYL ALCOHOL Collection Time: 08/30/18 10:43 PM  
Result Value Ref Range ALCOHOL(ETHYL),SERUM <3 MG/DL  
LIPASE Collection Time: 08/30/18 10:43 PM  
Result Value Ref Range Lipase 157 73 - 393 U/L MAGNESIUM Collection Time: 08/30/18 10:43 PM  
Result Value Ref Range Magnesium 2.3 1.8 - 2.4 mg/dL POC TROPONIN-I Collection Time: 08/30/18 10:48 PM  
Result Value Ref Range Troponin-I (POC) 0 (L) 0.02 - 0.05 ng/ml POC LACTIC ACID Collection Time: 08/30/18 10:50 PM  
Result Value Ref Range Lactic Acid (POC) 0.9 0.5 - 1.9 mmol/L  
URINALYSIS W/ RFLX MICROSCOPIC Collection Time: 08/30/18 11:34 PM  
Result Value Ref Range Color YELLOW Appearance CLEAR Specific gravity 1.028 (H) 1.001 - 1.023    
 pH (UA) 5.5 5.0 - 9.0 Protein NEGATIVE  NEG mg/dL Glucose NEGATIVE  mg/dL Ketone TRACE (A) NEG mg/dL Bilirubin NEGATIVE  NEG Blood NO GROWTH (A) NEG Urobilinogen 0.2 0.2 - 1.0 EU/dL Nitrites NEGATIVE  NEG Leukocyte Esterase MODERATE (A) NEG    
DRUG SCREEN, URINE Collection Time: 08/30/18 11:34 PM  
Result Value Ref Range PCP(PHENCYCLIDINE) NEGATIVE BENZODIAZEPINES NEGATIVE     
 COCAINE NEGATIVE AMPHETAMINES NEGATIVE METHADONE NEGATIVE     
 THC (TH-CANNABINOL) NEGATIVE     
 OPIATES NEGATIVE     
 BARBITURATES NEGATIVE URINE MICROSCOPIC Collection Time: 08/30/18 11:34 PM  
Result Value Ref Range WBC 10-20 0 /hpf  
 RBC 0 0 /hpf Epithelial cells 0 0 /hpf Bacteria 0 0 /hpf Casts 0 0 /lpf Crystals, urine 0 0 /LPF Mucus 0 0 /lpf Imaging Studies: CXR Results  (Last 48 hours) 08/30/18 2309  XR CHEST PA LAT Final result Impression:  IMPRESSION: Normal chest.  
   
   
   
   
  
 Narrative:  EXAM:  XR CHEST PA LAT INDICATION:   cp  
   
COMPARISON: 3/14/2017. FINDINGS: PA and lateral radiographs of the chest demonstrate clear lungs. The  
cardiac and mediastinal contours and pulmonary vascularity are normal.  The  
bones and soft tissues are within normal limits. CT Results  (Last 48 hours) 08/30/18 2301  CT HEAD WO CONT Final result Impression:  IMPRESSION:  
   
Unremarkable brain CT without intravenous contrast.  
   
Date of Dictation: 8/30/2018 11:02 PM  
   
  
 Narrative:  CT HEAD WITHOUT CONTRAST HISTORY:  Transient alteration of awareness. COMPARISON: None. TECHNIQUE: Axial imaging was performed without intravenous contrast utilizing 5mm slice thickness. Sagittal and coronal reformats were performed. Radiation  
dose reduction techniques were used for this study. Our CT scanner uses one or  
all of the following: Automated exposure control, adjustment of the MAS or KUB according to patient's  
size and iterative reconstruction. FINDINGS:      
   
*BRAIN:   
   -  There are no early signs of territorial or lacunar infarction by CT.  
   -  No intracranial mass, hematoma, or hydrocephalus.   
   -  No gross white matter abnormality by CT.  
   
*VISUALIZED PARANASAL SINUSES: Well aerated. *MASTOIDS:  Clear. *CALVARIUM AND SCALP: Unremarkable. Assessment and Plan:  
 
Hospital Problems as of 8/31/2018  Date Reviewed: 12/18/2017 Codes Class Noted - Resolved POA Altered mental status ICD-10-CM: R41.82 
ICD-9-CM: 780.97  8/31/2018 - Present Unknown Depression ICD-10-CM: F32.9 ICD-9-CM: 232  9/15/2015 - Present Yes A/P: 
 
-Altered mental status Could be secondary to dehydration vs ?stroke vs UTI Patient clinically improving at ED 
CT brain negative No WBC elevation, afebrile. Few WBC on UA Plan MRI Brain/echo and carotid US for stroke work up Monitor in telemetry Start ASA/statins Check orthostatics PT eval 
Check TSH, folic acid and R00 
IVF for hydration 
 
-UTI Start Rocephin Follow ucx 
 
-Depression Stable Restart home meds DVT ppx :heparin Code status: Full Estimated LOS:  1-2 nights Signed: Felix Bennett MD

## 2018-08-31 NOTE — ED TRIAGE NOTES
EMS states \"Patient has been in the past a little tired in the afternoon and takes a nap around 4 or 5. Patients  found her sleeping at her desk around 5:30 and every time she moves she is throwing up. Race score is 0. Patient is lethargic\"

## 2018-08-31 NOTE — PROGRESS NOTES
Pt seen and examined at bedside.  present. Dizziness/lightheaded at work yesterday, more fatigued than usual. No syncope/LOC; ? Slurred speech. Treated for UTI but asymptomatic. Stroke w/u unrevealing so far. Echo pending. Patient and  both report she's back to her baseline. If echo unremarkable will discharge home.

## 2018-08-31 NOTE — PROGRESS NOTES
08/31/18 1509 Dual Skin Pressure Injury Assessment Dual Skin Pressure Injury Assessment WDL Second Care Provider (Based on [address] [place] [address]) Sheryle Clara, RN Skin Integumentary Skin Integumentary (WDL) WDL Wound Prevention and Protection Methods Orientation of Wound Prevention Posterior Location of Wound Prevention Sacrum/Coccyx Dressing Present  No  
Wound Offloading (Prevention Methods) Bed, pressure reduction mattress;Repositioning;Pillows Skin dry and intact; no redness or breakdown noted. Heels and sacrum intact. No redness or open areas noted. Will continue to monitor for changes.

## 2018-08-31 NOTE — ED PROVIDER NOTES
HPI Comments: Presents with complaint of altered mental status. Patient was found by her  sleeping at her desk after having episodes of nausea and vomiting. He reports every time he tried to move her she would throw up. She denies any  Dizzy feeling just need to throw up every time she moved. She's been slow to answer questions not  Responding appropriately. She complains of pain in both her shoulders and some shortness of breath. She is falling asleep on talking to her. This is never happened before. She went to urgent care and they sent her here. She states both of her shoulders hurt. Patient is a 52 y.o. female presenting with lethargy, dizziness, and vomiting. The history is provided by the spouse and the patient. Lethargy This is a new problem. Episode onset: . The problem occurs constantly. The problem has not changed since onset. Associated symptoms include shortness of breath. Pertinent negatives include no chest pain, no abdominal pain and no headaches. The symptoms are aggravated by exertion. Nothing relieves the symptoms. Dizziness Associated symptoms include shortness of breath and vomiting. Pertinent negatives include no chest pain and no headaches. Vomiting Pertinent negatives include no abdominal pain, no headaches and no headaches. Past Medical History:  
Diagnosis Date  Contact dermatitis and other eczema, due to unspecified cause  Depression Past Surgical History:  
Procedure Laterality Date  HX  SECTION    
 HX TONSIL AND ADENOIDECTOMY  HX TUBAL LIGATION    
 HX WISDOM TEETH EXTRACTION Family History:  
Problem Relation Age of Onset  Cancer Mother  Alzheimer Father Social History Social History  Marital status:  Spouse name: N/A  
 Number of children: N/A  
 Years of education: N/A Occupational History  Not on file. Social History Main Topics  Smoking status: Never Smoker  Smokeless tobacco: Never Used  Alcohol use 0.0 oz/week  
  0 Standard drinks or equivalent per week Comment: occ  Drug use: No  
 Sexual activity: Not on file Other Topics Concern  Not on file Social History Narrative ALLERGIES: Review of patient's allergies indicates no known allergies. Review of Systems Unable to perform ROS: Mental status change Respiratory: Positive for shortness of breath. Cardiovascular: Negative for chest pain. Gastrointestinal: Positive for vomiting. Negative for abdominal pain. Neurological: Positive for dizziness. Negative for headaches. Vitals:  
 08/31/18 0002 08/31/18 0022 08/31/18 0102 08/31/18 0122 BP: 118/70 111/68 134/73 110/63 Pulse:      
Resp:      
SpO2: 94% 94% 95% 95% Weight:      
Height:      
      
 
Physical Exam  
Constitutional: She appears well-developed and well-nourished. She appears lethargic. She appears distressed (seems confused). HENT:  
Head: Normocephalic and atraumatic. Eyes: Conjunctivae and EOM are normal. Pupils are equal, round, and reactive to light. No nystagmus Neck: Normal range of motion. Neck supple. Cardiovascular: Normal rate and regular rhythm. Pulmonary/Chest: Effort normal and breath sounds normal. No respiratory distress. She has no wheezes. She has no rales. Abdominal: Soft. She exhibits no distension. There is no tenderness. There is no rebound and no guarding. Musculoskeletal: Normal range of motion. She exhibits no edema or tenderness. Neurological: She appears lethargic. She is disoriented. No cranial nerve deficit. Coordination normal.  
Skin: Skin is warm and dry. No rash noted. She is not diaphoretic. No erythema. Psychiatric: Her mood appears not anxious. She is slowed. She is inattentive. Nursing note and vitals reviewed. MDM Number of Diagnoses or Management Options Acute cystitis without hematuria:  
 Acute encephalopathy:  
Diagnosis management comments: EKG shows normal sinus rhythm with no ST elevation. Unclear what occurred earlier as she denied dizziness at this persistent nausea and vomiting and lethargic afterwards. She has a UTI. Admitted to the hospital for observation and IV antibiotics and reevaluation in the morning. Amount and/or Complexity of Data Reviewed Clinical lab tests: ordered and reviewed Discuss the patient with other providers: yes Independent visualization of images, tracings, or specimens: yes Risk of Complications, Morbidity, and/or Mortality Presenting problems: high Diagnostic procedures: moderate Management options: high Patient Progress Patient progress: stable ED Course Procedures

## 2018-08-31 NOTE — PROGRESS NOTES
Problem: Mobility Impaired (Adult and Pediatric) Goal: *Acute Goals and Plan of Care (Insert Text) LTG: 
(1.)Ms. Gianna Corey will move from supine to sit and sit to supine , scoot up and down and roll side to side in bed with INDEPENDENCE within 7 treatment day(s). (2.)Ms. Gianna Corey will transfer from bed to chair and chair to bed with MODIFIED INDEPENDENCE using the least restrictive device within 7 treatment day(s). (3.)Ms. Gianna Corey will ambulate with MODIFIED INDEPENDENCE for 500 feet with the least restrictive device within 7 treatment day(s). ________________________________________________________________________________________________ PHYSICAL THERAPY: Initial Assessment, PM 8/31/2018 OBSERVATION: Hospital Day: 2 Payor: BLUE [place]OSS [place] / [place] / Product Type: PPO /  
  
NAME/AGE/GENDER: Guy Heredia is a 52 y.o. female PRIMARY DIAGNOSIS: Altered mental status Altered mental status Altered mental status ICD-10: Treatment Diagnosis:  
 · Generalized Muscle Weakness (M62.81) · Other abnormalities of gait and mobility (R26.89) Precaution/Allergies: 
Review of patient's allergies indicates no known allergies. ASSESSMENT:  
 
Ms. Gianna Corey is a 52 y.o. female in the hospital for the above who was supine in bed upon arrival.  Pt reports that she lives in a two story house with her  that has 1 steps to enter. Pt also reported that PTA she was independent with ADLs and ambulated with independence. Pt admitted to no recent falls in the past year. Ms. Gianna Corey presents to PT with [facility] AROM and slight weakness strength in B LEs. Pt also presents with [facility] PROM and normal tone. Pt reported intact sensation in B LEs as well and demonstrated WFL B LE coordination. During evaluation pt performed STS with CGA and demonstrated fair to good standing balance.   While ambulating pt required Tony for loss of balance before getting back to bed. Ms. Destiny Rosario could benefit from skilled PT as she is currently functioning slightly below her baseline. This section established at most recent assessment PROBLEM LIST (Impairments causing functional limitations): 1. Decreased Strength 2. Decreased Ambulation Ability/Technique 3. Decreased Balance INTERVENTIONS PLANNED: (Benefits and precautions of physical therapy have been discussed with the patient.) 1. Balance Exercise 2. Bed Mobility 3. Family Education 4. Gait Training 5. Therapeutic Activites 6. Therapeutic Exercise/Strengthening 7. Transfer Training 8. Group Therapy TREATMENT PLAN: Frequency/Duration: 3 times a week for duration of hospital stay Rehabilitation Potential For Stated Goals: Good RECOMMENDED REHABILITATION/EQUIPMENT: (at time of discharge pending progress): Due to the probability of continued deficits (see above) this patient will not likely need continued skilled physical therapy after discharge. Equipment:  
? None at this time HISTORY:  
History of Present Injury/Illness (Reason for Referral): 
See H&P Past Medical History/Comorbidities: Ms. Destiny Rosario  has a past medical history of Contact dermatitis and other eczema, due to unspecified cause and Depression. Ms. Destiny Rosario  has a past surgical history that includes hx tonsil and adenoidectomy; hx wisdom teeth extraction; hx  section; and hx tubal ligation. Social History/Living Environment:  
Home Environment: Private residence # Steps to Enter: 1 One/Two Story Residence: Two story, live on 1st floor Living Alone: No 
Support Systems: Spouse/Significant Other/Partner Patient Expects to be Discharged to[de-identified] Unknown Current DME Used/Available at Home: None Prior Level of Function/Work/Activity: 
Pt reported that she lives in a two story house with her  and PTA pt was independent with ADLs and ambulation. Number of Personal Factors/Comorbidities that affect the Plan of Care: 1-2: MODERATE COMPLEXITY EXAMINATION:  
Most Recent Physical Functioning:  
Gross Assessment: 
AROM: Within functional limits PROM: Within functional limits Strength: Generally decreased, functional (L hip flexion 4/5) Coordination: Within functional limits Tone: Normal 
Sensation: Intact Posture: 
  
Balance: 
Sitting: Intact Standing: Impaired Standing - Static: Good Standing - Dynamic : Fair Bed Mobility: 
Supine to Sit: Independent Sit to Supine: Modified independent Wheelchair Mobility: 
  
Transfers: 
Sit to Stand: Stand-by assistance Stand to Sit: Stand-by assistance Gait: 
  
Speed/Yoly: Slow Gait Abnormalities: Trunk sway increased Distance (ft): 75 Feet (ft) Assistive Device: Other (comment) (HHA) Ambulation - Level of Assistance: Contact guard assistance;Minimal assistance Body Structures Involved: 1. Muscles Body Functions Affected: 1. Neuromusculoskeletal 
2. Movement Related Activities and Participation Affected: 1. Mobility 2. Community, Social and [place] Number of elements that affect the Plan of Care: 4+: HIGH COMPLEXITY CLINICAL PRESENTATION:  
Presentation: Stable and uncomplicated: LOW COMPLEXITY CLINICAL DECISION MAKIN[address] [place] [address]-[facility] 6 Clicks Basic Mobility Inpatient Short Form How much difficulty does the patient currently have. .. Unable A Lot A Little None 1. Turning over in bed (including adjusting bedclothes, sheets and blankets)? [] 1   [] 2   [] 3   [x] 4  
2. Sitting down on and standing up from a chair with arms ( e.g., wheelchair, bedside commode, etc.)   [] 1   [] 2   [] 3   [x] 4  
3. Moving from lying on back to sitting on the side of the bed? [] 1   [] 2   [] 3   [x] 4 How much help from another person does the patient currently need. .. Total A Lot A Little None 4. Moving to and from a bed to a chair (including a wheelchair)? [] 1   [] 2   [x] 3   [] 4  
5. Need to walk in hospital room? [] 1   [] 2   [x] 3   [] 4  
6. Climbing 3-5 steps with a railing? [] 1   [] 2   [x] 3   [] 4  
© 2007, Trustees of [address] [place] [address] [address] [address], under license to [employer]. All rights reserved Score:  Initial: 21 Most Recent: X (Date: -- ) Interpretation of Tool:  Represents activities that are increasingly more difficult (i.e. Bed mobility, Transfers, Gait). Score 24 23 22-20 19-15 14-10 9-7 6 Modifier CH CI CJ CK CL CM CN   
 
? Mobility - Walking and Moving Around:  
  - CURRENT STATUS: CJ - 20%-39% impaired, limited or restricted  - GOAL STATUS: CI - 1%-19% impaired, limited or restricted  - D/C STATUS:  ---------------To be determined--------------- Payor: [address] [place] [address] / Plan[place] / Product Type: PPO /   
 
Medical Necessity:    
· Patient demonstrates good rehab potential due to higher previous functional level. Reason for Services/Other Comments: 
· Patient continues to require skilled intervention due to decreased balance and ambulation. Use of outcome tool(s) and clinical judgement create a POC that gives a: Clear prediction of patient's progress: LOW COMPLEXITY  
  
 
 
 
TREATMENT:  
(In addition to Assessment/Re-Assessment sessions the following treatments were rendered) Pre-treatment Symptoms/Complaints:  None Pain: Initial:  
Pain Intensity 1: 0  Post Session:  0 Assessment/Reassessment only, no treatment provided today Braces/Orthotics/Lines/Etc:  
· O2 Device: Room air Treatment/Session Assessment:   
· Response to Treatment:  Tolerated without complaints. · Interdisciplinary Collaboration:  
o Physical Therapist 
o Registered Nurse · After treatment position/precautions:  
o Supine in bed 
o Bed/Chair-wheels locked 
o Bed in low position 
o Family at bedside · Compliance with Program/Exercises: Will assess as treatment progresses. · Recommendations/Intent for next treatment session: \"Next visit will focus on advancements to more challenging activities and reduction in assistance provided\". Total Treatment Duration: PT Patient Time In/Time Out Time In: 7711 Time Out: 4158 Ivette May, PT, DPT

## 2018-09-01 VITALS
OXYGEN SATURATION: 97 % | DIASTOLIC BLOOD PRESSURE: 84 MMHG | TEMPERATURE: 97.8 F | HEART RATE: 69 BPM | SYSTOLIC BLOOD PRESSURE: 122 MMHG | WEIGHT: 200 LBS | RESPIRATION RATE: 18 BRPM | BODY MASS INDEX: 36.8 KG/M2 | HEIGHT: 62 IN

## 2018-09-01 PROBLEM — R55 VASOVAGAL NEAR SYNCOPE: Status: ACTIVE | Noted: 2018-09-01

## 2018-09-01 PROBLEM — R55 VASOVAGAL NEAR SYNCOPE: Status: RESOLVED | Noted: 2018-09-01 | Resolved: 2018-09-01

## 2018-09-01 PROBLEM — R41.82 ALTERED MENTAL STATUS: Status: RESOLVED | Noted: 2018-08-31 | Resolved: 2018-09-01

## 2018-09-01 LAB
BASOPHILS # BLD: 0 K/UL (ref 0–0.2)
BASOPHILS NFR BLD: 1 % (ref 0–2)
DIFFERENTIAL METHOD BLD: ABNORMAL
EOSINOPHIL # BLD: 0.1 K/UL (ref 0–0.8)
EOSINOPHIL NFR BLD: 1 % (ref 0.5–7.8)
ERYTHROCYTE [DISTWIDTH] IN BLOOD BY AUTOMATED COUNT: 13.3 %
HCT VFR BLD AUTO: 41.2 % (ref 35.8–46.3)
HGB BLD-MCNC: 13.3 G/DL (ref 11.7–15.4)
IMM GRANULOCYTES # BLD: 0 K/UL (ref 0–0.5)
IMM GRANULOCYTES NFR BLD AUTO: 0 % (ref 0–5)
LYMPHOCYTES # BLD: 1.4 K/UL (ref 0.5–4.6)
LYMPHOCYTES NFR BLD: 35 % (ref 13–44)
MCH RBC QN AUTO: 30.4 PG (ref 26.1–32.9)
MCHC RBC AUTO-ENTMCNC: 32.3 G/DL (ref 31.4–35)
MCV RBC AUTO: 94.3 FL (ref 79.6–97.8)
MONOCYTES # BLD: 0.3 K/UL (ref 0.1–1.3)
MONOCYTES NFR BLD: 8 % (ref 4–12)
NEUTS SEG # BLD: 2.1 K/UL (ref 1.7–8.2)
NEUTS SEG NFR BLD: 55 % (ref 43–78)
NRBC # BLD: 0 K/UL (ref 0–0.2)
PLATELET # BLD AUTO: 155 K/UL (ref 150–450)
PMV BLD AUTO: 10 FL (ref 9.4–12.3)
RBC # BLD AUTO: 4.37 M/UL (ref 4.05–5.2)
WBC # BLD AUTO: 3.9 K/UL (ref 4.3–11.1)

## 2018-09-01 PROCEDURE — 99218 HC RM OBSERVATION: CPT

## 2018-09-01 PROCEDURE — 74011250637 HC RX REV CODE- 250/637: Performed by: INTERNAL MEDICINE

## 2018-09-01 PROCEDURE — 85025 COMPLETE CBC W/AUTO DIFF WBC: CPT

## 2018-09-01 PROCEDURE — 36415 COLL VENOUS BLD VENIPUNCTURE: CPT

## 2018-09-01 RX ADMIN — ASPIRIN 325 MG ORAL TABLET 325 MG: 325 PILL ORAL at 08:50

## 2018-09-01 RX ADMIN — SERTRALINE HYDROCHLORIDE 100 MG: 100 TABLET ORAL at 08:50

## 2018-09-01 NOTE — DISCHARGE SUMMARY
Hospitalist Discharge Summary     Admit Date:  2018 10:24 PM   Name:  Michele Amor   Age:  52 y.o.  :  1965   MRN:  [id number]   PCP:  Jennifer Navarro DO  Treatment Team: Attending Provider: Kelechi Bruno MD; Hospitalist: Bouchra Whipple MD; Primary Nurse: June Valencia; Utilization Review: Zaire Cyr    Problem List for this Hospitalization:  Hospital Problems as of 2018  Date Reviewed: 2017          Codes Class Noted - Resolved POA    Vasovagal near syncope ICD-10-CM: R55  ICD-9-CM: 780.2  2018 - Present Yes        * (Principal)Altered mental status ICD-10-CM: R41.82  ICD-9-CM: 780.97  2018 - Present Yes        Depression ICD-10-CM: F32.9  ICD-9-CM: 263  9/15/2015 - Present Yes                Admission HPI from 2018:    52years old F with PMH of depression, anxiety was brought to the ED by her  due to AMS at work. Patient noted she works at [employer], around 2:00 PM she was very busy. Patient was sweating a lot and having dizziness. Patient described dizziness as lightheadedness worse when she was trying to stand up from her desk. Patient also reported having nausea and vomiting several times. Patient noted by the end of the day she was having generalized weakness and she was unable to stand up by herself.  stated she was confused and weak at work.  noted she improved while in the the ED. Patient denies diarrhea, abdominal pain, dysuria, history of vertigo, focal weakness, SOB.     In the ED CT brain did not reveal any acute disease. UA with few WBC. Hospital Course:  51 y/o WF with MDD/anxiety admitted on  with confusion and a near syncopal episode. 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 She became flushed, diaphoretic and lightheaded with near-syncope. She felt weak.  She underwent CVA work up while in the hospital which included head CT, MRI, carotid dopplers and echocardiogram, all of which were unremarkable. A1C and lipids were normal. She was given ceftriaxone for a possible UTI, although she is asymptomatic. Patient's event is very consistent with vasovagal near-syncope due to prolonged standing and she has no evidence of stroke or cardiac disease. She does not have an indication for statin therapy. She has improved during her stay and will be discharged home with her regular medications. Follow up instructions and discharge meds at bottom of this note. Plan was discussed with patient. All questions answered. Her hospital course was otherwise unremarkable and she is stable at time of discharge. Diagnostic Imaging/Tests:   Xr Chest Pa Lat    Result Date: 8/30/2018  EXAM:  XR CHEST PA LAT INDICATION:   cp COMPARISON: 3/14/2017. FINDINGS: PA and lateral radiographs of the chest demonstrate clear lungs. The cardiac and mediastinal contours and pulmonary vascularity are normal.  The bones and soft tissues are within normal limits. IMPRESSION: Normal chest.     Mri Brain Wo Cont    Result Date: 8/31/2018  MRI BRAIN WITHOUT CONTRAST 8/31/2018 HISTORY: Dizziness. Lightheadedness. Nausea and vomiting. Generalized weakness. TECHNIQUE: Sagittal and axial T1-weighted, axial T2-weighted, axial and coronal FLAIR, axial T2-weighted gradient-echo, axial diffusion weighted images with ADC maps of the brain. COMPARISON: Head CT 8/30/2018 FINDINGS: There is no acute infarction, acute intracranial hemorrhage, intra-axial mass, hydrocephalus, or abnormal extra-axial fluid collection. On the T2-weighted and FLAIR sequences, there are a few punctate hyperintense foci within the frontal white matter and gomez. This is not an uncommon finding which may be present with asymptomatic patients, with migraine headaches or with mild chronic small vessel ischemic disease. Mild mucosal thickening is present in the right maxillary sinus and ethmoid air cells.  There is no hydrocephalus, intra-axial mass, or abnormal extra-axial fluid collection. IMPRESSION: 1. No acute infarction. 2. Nonspecific white matter findings present as described. This pattern may be present with chronic small vessel ischemic disease or migraine headaches. Ct Head Wo Cont    Result Date: 8/30/2018  CT HEAD WITHOUT CONTRAST HISTORY:  Transient alteration of awareness. COMPARISON: None. TECHNIQUE: Axial imaging was performed without intravenous contrast utilizing 5mm slice thickness. Sagittal and coronal reformats were performed. Radiation dose reduction techniques were used for this study. Our CT scanner uses one or all of the following: Automated exposure control, adjustment of the MAS or KUB according to patient's size and iterative reconstruction. FINDINGS:    *BRAIN:    -  There are no early signs of territorial or lacunar infarction by CT.    -  No intracranial mass, hematoma, or hydrocephalus.    -  No gross white matter abnormality by CT. *VISUALIZED PARANASAL SINUSES: Well aerated. *MASTOIDS:  Clear. *CALVARIUM AND SCALP: Unremarkable. IMPRESSION: Unremarkable brain CT without intravenous contrast. Date of Dictation: 8/30/2018 11:02 PM     Duplex Carotid Bilateral    Result Date: 8/31/2018  Examination: Carotid ultrasound. Comparison: None. Indication: CVA Findings: Color and power Doppler imaging as well as spectral analysis of the bilateral extracranial carotid systems were performed. On the right, there is no significant plaque. There is no hemodynamically significant stenosis. The vertebral artery has antegrade flow. Peak systolic velocities are as follows: CCA 77; ICA 83; ECA 74 cm/s. ICA to CCA ratio 1.1. On the left, there is no significant plaque. There is no hemodynamically significant stenosis. The vertebral artery has antegrade flow. Peak systolic velocities are as follows: CCA 94; ; ECA 81 cm/s. ICA/CCA ratio 1.1. Impression: 1.  No significant plaque involving the bilateral extracranial carotid arterial systems, with no hemodynamically significant stenosis. Echocardiogram results:  Results for orders placed or performed during the hospital encounter of 18   2D ECHO COMPLETE ADULT (TTE) W OR [address] [facility]  [address] [facility], [address] [facility]  [phone]    Transthoracic Echocardiogram  2D, M-mode, Doppler, and Color Doppler    Patient: Clinton BONNER  MR #: [id number]  : 10-Grupo-1965  Age: 52 years  Gender: Female  Study date: 31-Aug-2018  Account #: [de-identified]  Height: 62 in  Weight: 199.5 lb  BSA: 1.91 mï¾²  Status:Routine  Location: 723  BP: 106/ 73    Allergies: NO KNOWN ALLERGIES    Sonographer:  Tip Resendiz RDCS  Group:  [facility]  Referring Physician:  Pablo Vines MD  Reading Physician:  SHELBY Grover MD [facility]    INDICATIONS: Dizziness, Stroke? PROCEDURE: This was a routine study. A transthoracic echocardiogram was  performed in the patients Left arm. The study included complete 2D imaging,  M-mode, complete spectral Doppler, and color Doppler. Intravenous contrast  (agitated saline) was administered. Image quality was adequate. LEFT VENTRICLE: Size was normal. Systolic function was normal. Ejection  fraction was estimated in the range of 55 % to 60 %. There were no regional  wall motion abnormalities. Wall thickness was normal. Avg. E/e'= 5.9. Left  ventricular diastolic function parameters were normal.    RIGHT VENTRICLE: The size was normal. Systolic function was normal. The  tricuspid jet envelope definition was inadequate for estimation of RV   systolic  pressure. LEFT ATRIUM: Size was normal.    ATRIAL SEPTUM: Contrast injection was performed. There was no right-to-left  shunt, with provocative maneuvers to increase right atrial pressure. RIGHT ATRIUM: Size was normal.    SYSTEMIC VEINS: IVC: The inferior vena cava was not well visualized.     AORTIC VALVE: The valve was probably trileaflet. There was no evidence for  stenosis. There was no insufficiency. MITRAL VALVE: Valve structure was normal. There was no evidence for stenosis. There was no regurgitation. TRICUSPID VALVE: The valve structure was normal. There was no evidence for  stenosis. There was trivial regurgitation. PULMONIC VALVE: Not well visualized. There was no evidence for stenosis. There  was no insufficiency. PERICARDIUM: There was no pericardial effusion. AORTA: The root exhibited normal size. SUMMARY:    -  Left ventricle: Systolic function was normal. Ejection fraction was  estimated in the range of 55 % to 60 %. There were no regional wall motion  abnormalities. -  Atrial septum: Contrast injection was performed. There was no   right-to-left  shunt, with provocative maneuvers to increase right atrial pressure. -  Tricuspid valve: There was trivial regurgitation. SYSTEM MEASUREMENT TABLES    2D mode  AoR Diam (2D): 3.1 cm  LA Dimension (2D): 3.5 cm  Left Atrium Systolic Volume Index; Method of Disks, Biplane; 2D mode;: 33.6  ml/m2  IVS/LVPW (2D): 1  IVSd (2D): 0.9 cm  LVIDd (2D): 4.4 cm  LVIDs (2D): 3 cm  LVOT Area (2D): 3.5 cm2  LVPWd (2D): 0.9 cm  RVIDd (2D): 3.1 cm    Unspecified Scan Mode  Peak Grad; Mean; Antegrade Flow: 8 mm[Hg]  Vmax; Antegrade Flow: 152 cm/s  LVOT Diam: 2.1 cm  MV Peak Jhon/LV Peak Tissue Jhon E-Wave; Lateral MA: 4.7  MV Peak Jhon/LV Peak Tissue Jhon E-Wave; Medial MA: 7.1    Prepared and signed by    SHELBY Richard MD [facility]  Signed 31-Aug-2018 18:48:19           All Micro Results     Procedure Component Value Units Date/Time    CULTURE, URINE [[id number]]  (Abnormal) Collected:  08/30/18 6474    Order Status:  Completed Specimen:  Urine from Cath Urine Updated:  09/01/18 0801     Special Requests: NO SPECIAL REQUESTS        Culture result:         >100,000 COLONIES/mL GRAM POSITIVE COCCI (A)              10,000 to 50,000 COLONIES/mL MIXED SKIN SALVADOR ISOLATED      SUBCULTURE IN PROGRESS       CULTURE, BLOOD [[id number]] Collected:  08/31/18 0121    Order Status:  Completed Specimen:  Blood from Blood Updated:  09/01/18 0617     Special Requests: --        LEFT  HAND       Culture result: NO GROWTH 1 DAY       CULTURE, BLOOD [[id number]] Collected:  08/31/18 0122    Order Status:  Completed Specimen:  Blood from Blood Updated:  09/01/18 0617     Special Requests: --        LEFT  FOREARM       Culture result: NO GROWTH 1 DAY             Labs: Results:       BMP, Mg, Phos Recent Labs      08/31/18 0411 08/30/18   2243   NA  146*  145   K  3.5  4.2   CL  112*  111*   CO2  24  24   AGAP  10  10   BUN  21  24*   CREA  0.49*  0.67   CA  8.3  8.3   GLU  97  125*   MG   --   2.3      CBC Recent Labs      09/01/18 0523 08/30/18   2243   WBC  3.9*  5.7   RBC  4.37  4.60   HGB  13.3  14.2   HCT  41.2  42.8   PLT  155  170   GRANS  55  85*   LYMPH  35  11*   EOS  1  0*   MONOS  8  4   BASOS  1  0   IG  0  0   ANEU  2.1  4.9   ABL  1.4  0.6   ALYSSA  0.1  0.0   ABM  0.3  0.2   ABB  0.0  0.0   AIG  0.0  0.0      LFT Recent Labs      08/31/18 0411 08/30/18   2243   SGOT  11*  16   ALT  22  25   AP  67  74   TP  6.2*  6.6   ALB  3.2*  3.4*   GLOB  3.0  3.2   AGRAT  1.1*  1.1*      Cardiac Testing No results found for: BNPP, BNP, CPK, RCK1, RCK2, RCK3, RCK4, CKMB, CKNDX, CKND1, TROPT, TROIQ   Coagulation Tests No results found for: PTP, INR, APTT   A1c Lab Results   Component Value Date/Time    Hemoglobin A1c 5.1 08/31/2018 04:11 AM      Lipid Panel Lab Results   Component Value Date/Time    Cholesterol, total 218 (H) 08/31/2018 04:11 AM    HDL Cholesterol 68 (H) 08/31/2018 04:11 AM    LDL, calculated 137.2 (H) 08/31/2018 04:11 AM    VLDL, calculated 12.8 08/31/2018 04:11 AM    Triglyceride 64 08/31/2018 04:11 AM    CHOL/HDL Ratio 3.2 08/31/2018 04:11 AM      Thyroid Panel Lab Results   Component Value Date/Time    TSH 0.322 (L) 08/31/2018 04:11 AM    TSH 1.030 12/18/2017 04:07 PM        Most Recent UA Lab Results   Component Value Date/Time    Color YELLOW 08/30/2018 11:34 PM    Appearance CLEAR 08/30/2018 11:34 PM    pH (UA) 5.5 08/30/2018 11:34 PM    Protein NEGATIVE  08/30/2018 11:34 PM    Glucose NEGATIVE  08/30/2018 11:34 PM    Ketone TRACE (A) 08/30/2018 11:34 PM    Bilirubin NEGATIVE  08/30/2018 11:34 PM    Blood NO GROWTH (A) 08/30/2018 11:34 PM    Urobilinogen 0.2 08/30/2018 11:34 PM    Nitrites NEGATIVE  08/30/2018 11:34 PM    Leukocyte Esterase MODERATE (A) 08/30/2018 11:34 PM        No Known Allergies  Immunization History   Administered Date(s) Administered    Influenza Vaccine 10/22/2017       All Labs from Last 24 Hrs:  Recent Results (from the past 24 hour(s))   GLUCOSE, POC    Collection Time: 08/31/18 10:10 AM   Result Value Ref Range    Glucose (POC) 77 65 - 100 mg/dL   CBC WITH AUTOMATED DIFF    Collection Time: 09/01/18  5:23 AM   Result Value Ref Range    WBC 3.9 (L) 4.3 - 11.1 K/uL    RBC 4.37 4.05 - 5.2 M/uL    HGB 13.3 11.7 - 15.4 g/dL    HCT 41.2 35.8 - 46.3 %    MCV 94.3 79.6 - 97.8 FL    MCH 30.4 26.1 - 32.9 PG    MCHC 32.3 31.4 - 35.0 g/dL    RDW 13.3 %    PLATELET 182 707 - 209 K/uL    MPV 10.0 9.4 - 12.3 FL    ABSOLUTE NRBC 0.00 0.0 - 0.2 K/uL    DF AUTOMATED      NEUTROPHILS 55 43 - 78 %    LYMPHOCYTES 35 13 - 44 %    MONOCYTES 8 4.0 - 12.0 %    EOSINOPHILS 1 0.5 - 7.8 %    BASOPHILS 1 0.0 - 2.0 %    IMMATURE GRANULOCYTES 0 0.0 - 5.0 %    ABS. NEUTROPHILS 2.1 1.7 - 8.2 K/UL    ABS. LYMPHOCYTES 1.4 0.5 - 4.6 K/UL    ABS. MONOCYTES 0.3 0.1 - 1.3 K/UL    ABS. EOSINOPHILS 0.1 0.0 - 0.8 K/UL    ABS. BASOPHILS 0.0 0.0 - 0.2 K/UL    ABS. IMM.  GRANS. 0.0 0.0 - 0.5 K/UL       Current Med List in Hospital:   Current Facility-Administered Medications   Medication Dose Route Frequency    sodium chloride (NS) flush 5-10 mL  5-10 mL IntraVENous Q8H    sodium chloride (NS) flush 5-10 mL  5-10 mL IntraVENous PRN    heparin (porcine) injection 5,000 Units  5,000 Units SubCUTAneous Q8H    aspirin (ASPIRIN) tablet 325 mg  325 mg Oral DAILY    atorvastatin (LIPITOR) tablet 40 mg  40 mg Oral QHS    albuterol (PROVENTIL VENTOLIN) nebulizer solution 2.5 mg  2.5 mg Inhalation Q4H PRN    cefTRIAXone (ROCEPHIN) 1 g in 0.9% sodium chloride (MBP/ADV) 50 mL  1 g IntraVENous Q24H    0.9% sodium chloride infusion  125 mL/hr IntraVENous CONTINUOUS    sertraline (ZOLOFT) tablet 100 mg  100 mg Oral BID       Discharge Exam:  Patient Vitals for the past 24 hrs:   Temp Pulse Resp BP SpO2   09/01/18 0800 97.8 °F (36.6 °C) 69 18 122/84 97 %   09/01/18 0508 97.9 °F (36.6 °C) 63 16 117/78 95 %   08/31/18 2331 97.9 °F (36.6 °C) 65 16 106/70 97 %   08/31/18 1940 97.9 °F (36.6 °C) 67 16 108/71 97 %   08/31/18 1520 97.9 °F (36.6 °C) 72 14 109/67 96 %   08/31/18 1151 98 °F (36.7 °C) 63 16 107/71 97 %     Oxygen Therapy  O2 Sat (%): 97 % (09/01/18 0800)  Pulse via Oximetry: 59 beats per minute (08/31/18 0202)  O2 Device: Room air (08/31/18 0220)    Intake/Output Summary (Last 24 hours) at 09/01/18 0848  Last data filed at 08/31/18 2126   Gross per 24 hour   Intake              926 ml   Output                0 ml   Net              926 ml       General:    Well nourished. Alert. Eyes:   Normal sclera. Extraocular movements intact. ENT:  Normocephalic, atraumatic. Moist mucous membranes  CV:   Regular rate and rhythm. No murmur, rub, or gallop. Lungs:  Clear to auscultation bilaterally. No wheezing, rhonchi, or rales. Abdomen: Soft, nontender, nondistended. Bowel sounds normal.   Extremities: Warm and dry. No cyanosis or edema. Neurologic: CN II-XII grossly intact. Sensation intact. Skin:     No rashes or jaundice. Psych:  Normal mood and affect. Discharge Info:   Current Discharge Medication List      CONTINUE these medications which have NOT CHANGED    Details   ergocalciferol (ERGOCALCIFEROL) 50,000 unit capsule Take 1 Cap by mouth every seven (7) days.   Qty: 12 Cap, Refills: 1    Associated Diagnoses: Vitamin D deficiency      sertraline (ZOLOFT) 100 mg tablet Take 1 tablet twice daily  Qty: 180 Tab, Refills: 1    Associated Diagnoses: Anxiety and depression      albuterol (PROVENTIL HFA, VENTOLIN HFA, PROAIR HFA) 90 mcg/actuation inhaler Take 1 Puff by inhalation every four (4) hours as needed for Wheezing. Qty: 1 Inhaler, Refills: 1      loratadine (CLARITIN) 10 mg tablet Take 10 mg by mouth. Associated Diagnoses: Seasonal allergic rhinitis due to pollen               Disposition: home    Activity: Activity as tolerated  Diet: DIET REGULAR    Follow-up Appointments   Procedures    FOLLOW UP VISIT Appointment in: One Week PCP     PCP     Standing Status:   Standing     Number of Occurrences:   1     Order Specific Question:   Appointment in     Answer: One Week         Follow-up Information     Follow up With Details Comments [address] [place] [id number], [id number] [place] [address]  [phone]            Time spent in patient discharge planning and coordination 35 minutes.     Signed:  Heidy Garrison MD

## 2018-09-01 NOTE — DISCHARGE INSTRUCTIONS
Altered Mental Status: Care Instructions  Your Care Instructions    Altered mental status is a change in how well your brain is working. As a result, you may be confused, be less alert than usual, or act in odd ways. This may include seeing or hearing things that aren't really there (hallucinations). A mental status change has many possible causes. For example, it may be the result of an infection, an imbalance of chemicals in the body, or a chronic disease such as diabetes or COPD. It can also be caused by things such as a head injury, taking certain medicines, or using alcohol or drugs. The doctor may do tests to look for the cause. These tests may include urine tests, blood tests, and imaging tests such as a CT scan. Sometimes a clear cause isn't found. But tests can help the doctor rule out a serious cause of your symptoms. A change in mental status can be scary. But mental status will often return to normal when the cause is treated. So it is important to get any follow-up testing or treatment the doctor has suggested. The doctor has checked you carefully, but problems can develop later. If you notice any problems or new symptoms, get medical treatment right away. Follow-up care is a key part of your treatment and safety. Be sure to make and go to all appointments, and call your doctor if you are having problems. It's also a good idea to know your test results and keep a list of the medicines you take. How can you care for yourself at home? · Be safe with medicines. Take your medicines exactly as prescribed. Call your doctor if you think you are having a problem with your medicine. · Have another adult stay with you until you are better. This can help keep you safe. Ask that person to watch for signs that your mental status is getting worse. When should you call for help? Call 911 anytime you think you may need emergency care. For example, call if:    · You passed out (lost consciousness).  Call your doctor now or seek immediate medical care if:    · Your mental status is getting worse.     · You have new symptoms, such as a fever, chills, or shortness of breath.     · You do not feel safe.    Watch closely for changes in your health, and be sure to contact your doctor if:    · You do not get better as expected. Where can you learn more? Go to http://filemon-vanessa.info/. Enter T357 in the search box to learn more about \"Altered Mental Status: Care Instructions. \"  Current as of: October 9, 2017  Content Version: 11.7  © 4541-3350 [email or web address] Care instructions adapted under license by [employer] (which disclaims liability or warranty for this information). If you have questions about a medical condition or this instruction, always ask your healthcare professional. [address] any warranty or liability for your use of this information. Lightheadedness or Faintness: Care Instructions  Your Care Instructions  Lightheadedness is a feeling that you are about to faint or \"pass out. \" You do not feel as if you or your surroundings are moving. It is different from vertigo, which is the feeling that you or things around you are spinning or tilting. Lightheadedness usually goes away or gets better when you lie down. If lightheadedness gets worse, it can lead to a fainting spell. It is common to feel lightheaded from time to time. Lightheadedness usually is not caused by a serious problem. It often is caused by a short-lasting drop in blood pressure and blood flow to your head that occurs when you get up too quickly from a seated or lying position. Follow-up care is a key part of your treatment and safety. Be sure to make and go to all appointments, and call your doctor if you are having problems. It's also a good idea to know your test results and keep a list of the medicines you take. How can you care for yourself at home?   · Kirstie Hdz down for 1 or 2 minutes when you feel lightheaded. After lying down, sit up slowly and remain sitting for 1 to 2 minutes before slowly standing up. · Avoid movements, positions, or activities that have made you lightheaded in the past.  · Get plenty of rest, especially if you have a cold or flu, which can cause lightheadedness. · Make sure you drink plenty of fluids, especially if you have a fever or have been sweating. · Do not drive or put yourself and others in danger while you feel lightheaded. When should you call for help? Call 911 anytime you think you may need emergency care. For example, call if:    · You have symptoms of a stroke. These may include:  ¨ Sudden numbness, tingling, weakness, or loss of movement in your face, arm, or leg, especially on only one side of your body. ¨ Sudden vision changes. ¨ Sudden trouble speaking. ¨ Sudden confusion or trouble understanding simple statements. ¨ Sudden problems with walking or balance. ¨ A sudden, severe headache that is different from past headaches.     · You have symptoms of a heart attack. These may include:  ¨ Chest pain or pressure, or a strange feeling in the chest.  ¨ Sweating. ¨ Shortness of breath. ¨ Nausea or vomiting. ¨ Pain, pressure, or a strange feeling in the back, neck, jaw, or upper belly or in one or both shoulders or arms. ¨ Lightheadedness or sudden weakness. ¨ A fast or irregular heartbeat. After you call 911, the  may tell you to chew 1 adult-strength or 2 to 4 low-dose aspirin. Wait for an ambulance. Do not try to drive yourself.    Watch closely for changes in your health, and be sure to contact your doctor if:    · Your lightheadedness gets worse or does not get better with home care. Where can you learn more? Go to http://filemon-vanessa.info/. Enter V789 in the search box to learn more about \"Lightheadedness or Faintness: Care Instructions. \"  Current as of: November 20, 2017  Content Version: 11.7  © 3112-8978 [employer]. Care instructions adapted under license by [employer] (which disclaims liability or warranty for this information). If you have questions about a medical condition or this instruction, always ask your healthcare professional. [address] any warranty or liability for your use of this information. DISCHARGE SUMMARY from Nurse    PATIENT INSTRUCTIONS:    After general anesthesia or intravenous sedation, for 24 hours or while taking prescription Narcotics:  · Limit your activities  · Do not drive and operate hazardous machinery  · Do not make important personal or business decisions  · Do  not drink alcoholic beverages  · If you have not urinated within 8 hours after discharge, please contact your surgeon on call. Report the following to your surgeon:  · Excessive pain, swelling, redness or odor of or around the surgical area  · Temperature over 100.5  · Nausea and vomiting lasting longer than 4 hours or if unable to take medications  · Any signs of decreased circulation or nerve impairment to extremity: change in color, persistent  numbness, tingling, coldness or increase pain  · Any questions    What to do at Home:  Recommended activity: Activity as tolerated,     If you experience any of the following symptoms confusion, light headedness and or dizziness, fever greater then 100.5, please follow up with primary care doctor. *  Please give a list of your current medications to your Primary Care Provider. *  Please update this list whenever your medications are discontinued, doses are      changed, or new medications (including over-the-counter products) are added. *  Please carry medication information at all times in case of emergency situations.     These are general instructions for a healthy lifestyle:    No smoking/ No tobacco products/ Avoid exposure to second hand smoke  Surgeon General's Warning:  Quitting smoking now greatly reduces serious risk to your health. Obesity, smoking, and sedentary lifestyle greatly increases your risk for illness    A healthy diet, regular physical exercise & weight monitoring are important for maintaining a healthy lifestyle    You may be retaining fluid if you have a history of heart failure or if you experience any of the following symptoms:  Weight gain of 3 pounds or more overnight or 5 pounds in a week, increased swelling in our hands or feet or shortness of breath while lying flat in bed. Please call your doctor as soon as you notice any of these symptoms; do not wait until your next office visit. Recognize signs and symptoms of STROKE:    F-face looks uneven    A-arms unable to move or move unevenly    S-speech slurred or non-existent    T-time-call 911 as soon as signs and symptoms begin-DO NOT go       Back to bed or wait to see if you get better-TIME IS BRAIN. Warning Signs of HEART ATTACK     Call 911 if you have these symptoms:   Chest discomfort. Most heart attacks involve discomfort in the center of the chest that lasts more than a few minutes, or that goes away and comes back. It can feel like uncomfortable pressure, squeezing, fullness, or pain.  Discomfort in other areas of the upper body. Symptoms can include pain or discomfort in one or both arms, the back, neck, jaw, or stomach.  Shortness of breath with or without chest discomfort.  Other signs may include breaking out in a cold sweat, nausea, or lightheadedness. Don't wait more than five minutes to call 911 - MINUTES MATTER! Fast action can save your life. Calling 911 is almost always the fastest way to get lifesaving treatment. Emergency Medical Services staff can begin treatment when they arrive -- up to an hour sooner than if someone gets to the hospital by car. The discharge information has been reviewed with the patient. The patient verbalized understanding.   Discharge medications reviewed with the patient and appropriate educational materials and side effects teaching were provided.   ___________________________________________________________________________________________________________________________________

## 2018-09-01 NOTE — PROGRESS NOTES
Pt refused heparin injection. Nurse explained the purpose of this medication and she still refused. Patient education was printed out, explained, and provided to the patient on the Heparin injection and Lipitor.

## 2018-09-01 NOTE — PROGRESS NOTES
Gave discharge instructions to the pt. The pt voiced a clear understanding. IV removed x 1 ansd the primary care has been notified.

## 2018-09-02 LAB
BACTERIA SPEC CULT: ABNORMAL
SERVICE CMNT-IMP: ABNORMAL

## 2018-09-05 LAB
BACTERIA SPEC CULT: NORMAL
BACTERIA SPEC CULT: NORMAL
SERVICE CMNT-IMP: NORMAL
SERVICE CMNT-IMP: NORMAL

## 2018-10-30 PROBLEM — F32.A MILD DEPRESSION: Status: ACTIVE | Noted: 2018-10-30

## 2018-10-30 PROBLEM — E66.01 SEVERE OBESITY (HCC): Status: ACTIVE | Noted: 2018-10-30

## 2022-03-18 PROBLEM — E66.01 SEVERE OBESITY (HCC): Status: ACTIVE | Noted: 2018-10-30

## 2022-03-18 PROBLEM — J01.00 ACUTE MAXILLARY SINUSITIS: Status: ACTIVE | Noted: 2017-03-13

## 2022-03-18 PROBLEM — J06.9 URI, ACUTE: Status: ACTIVE | Noted: 2017-03-13

## 2022-03-19 PROBLEM — F32.A MILD DEPRESSION: Status: ACTIVE | Noted: 2018-10-30

## 2022-04-21 PROBLEM — C50.912 MALIGNANT NEOPLASM OF LEFT FEMALE BREAST, UNSPECIFIED ESTROGEN RECEPTOR STATUS, UNSPECIFIED SITE OF BREAST (HCC): Status: ACTIVE | Noted: 2022-04-21

## 2022-06-08 ENCOUNTER — TELEMEDICINE (OUTPATIENT)
Dept: FAMILY MEDICINE CLINIC | Facility: CLINIC | Age: 57
End: 2022-06-08
Payer: COMMERCIAL

## 2022-06-08 DIAGNOSIS — C50.912 MALIGNANT NEOPLASM OF LEFT FEMALE BREAST, UNSPECIFIED ESTROGEN RECEPTOR STATUS, UNSPECIFIED SITE OF BREAST (HCC): ICD-10-CM

## 2022-06-08 DIAGNOSIS — N36.2 URETHRAL CARUNCLE: Primary | ICD-10-CM

## 2022-06-08 DIAGNOSIS — Z79.811 AROMATASE INHIBITOR USE: ICD-10-CM

## 2022-06-08 PROBLEM — R59.0 AXILLARY LYMPHADENOPATHY: Status: ACTIVE | Noted: 2021-07-26

## 2022-06-08 PROCEDURE — 99214 OFFICE O/P EST MOD 30 MIN: CPT | Performed by: PHYSICIAN ASSISTANT

## 2022-06-08 RX ORDER — VENLAFAXINE HYDROCHLORIDE 37.5 MG/1
CAPSULE, EXTENDED RELEASE ORAL
Qty: 90 CAPSULE | Refills: 0 | Status: SHIPPED | OUTPATIENT
Start: 2022-06-08 | End: 2022-06-27 | Stop reason: SDUPTHER

## 2022-06-08 RX ORDER — ESTRADIOL 0.1 MG/G
CREAM VAGINAL
Qty: 42.5 G | Refills: 1 | Status: SHIPPED | OUTPATIENT
Start: 2022-06-08

## 2022-06-08 RX ORDER — AMOXICILLIN AND CLAVULANATE POTASSIUM 875; 125 MG/1; MG/1
1 TABLET, FILM COATED ORAL 2 TIMES DAILY
Qty: 14 TABLET | Refills: 0 | Status: SHIPPED | OUTPATIENT
Start: 2022-06-08 | End: 2022-06-15

## 2022-06-08 NOTE — PROGRESS NOTES
Patient: Cyrus Martinez  YOB: 1965  Patient Age 59 y.o. Patient sex: female  Medical Record:  [id number]  Visit UERS:5/8/3287   Author:  Casandra Terry. [address] [facility] 57 Note     [facility] Practice Virtual  Visit Note Video Conference Note  Location: To Patients electronic /phone device in their home  From Medical provider    Cyrus Martinez is a 59 y.o. y.o. female  being evaluated by a Virtual Visit (video visit) encounter to address concerns. A caregiver was present when appropriate. Due to this being a TeleHealth encounter (During KCXRN-85 public health emergency), evaluation of the following organ systems was limited: Vitals/Constitutional/EENT/Resp/CV/GI//MS/Neuro/Skin/Heme-Lymph-Imm. Pursuant to the emergency declaration under the [address] [place] [address] authority and the [employer] and [employer] Act, this Virtual Visit was conducted with patient's (and/or legal guardian's) consent, to reduce the risk of exposure to COVID-19 and provide necessary medical care. Consent:  The patient and/or health care decision maker is aware that that they may receive a bill for this audio-video service, depending on his insurance coverage, and has provided verbal consent to proceed: Yes    Chief Complaint  Cyrus Martinez  is a 59 y.o. female who was seen by synchronous (real-time) audio-video technology on 06/10/22  2:58 PM  for the following reasons:    Chief Complaint   Patient presents with    Other     vulva irritation      Pursuant to the emergency declaration under the [address] [place] [address] [facility] authority and the [employer] and [employer] Act, this Virtual  Visit was conducted, with patient's consent, to reduce the patient's risk of exposure to COVID-19 and provide continuity of care for an established patient.      Services were provided through a video synchronous discussion -- on DOXY. ME virtually to substitute for in-person clinic visit  Chief complaint  Janes Serar  is a 59 y.o. female who was seen by synchronous (real-time) audio-video technology on 06/10/22  2:57 PM  for the following reasons:    Chief Complaint   Patient presents with    Other     vulva irritation        Current Medical problems addressed    She has carbuncle that will flair up after intercourse -- gets swollen and tender otherwise doesn't bother her. She has tried the estrogen as lubricant but its still present and reflairs with intercourse/friction . No drainage, no fever or chills and no dysuria or hematuria. ASSESSMENT AND PLAN    ICD-10-CM    1. Urethral caruncle  N36.2 amoxicillin-clavulanate (AUGMENTIN) 875-125 MG per tablet     estradiol (ESTRACE) 0.1 MG/GM vaginal cream     [facility]   2. Aromatase inhibitor use  Z79.811 calcium carbonate-vitamin D (CALTRATE) 600-400 MG-UNIT TABS per tab     [facility]   3. Malignant neoplasm of left female breast, unspecified estrogen receptor status, unspecified site of breast [facility]  [facility]. Diagnoses and all orders for this visit:    Urethral caruncle  -     amoxicillin-clavulanate (AUGMENTIN) 875-125 MG per tablet; Take 1 tablet by mouth 2 times daily for 7 days  -     estradiol (ESTRACE) 0.1 MG/GM vaginal cream; Apply fingertip amount to urethral caruncle twice weekly x 2 months thereafter  -     [facility]    Aromatase inhibitor use  -     [address] [facility]    Malignant neoplasm of left female breast, unspecified estrogen receptor status, unspecified site of breast ([facility]  -     [address] [facility]      No follow-up provider specified.   Orders Placed This Encounter   Procedures    [address] [facility]     Referral Priority:   Routine     Referral Type:   Eval and Treat     Referral Reason:   Specialty Services Required     Number of Visits Requested:   1       Past Medical History: Allergies:No Known Allergies    Current Medications:   Medications marked \"taking\" at this time:  Current Outpatient Medications   Medication Sig Dispense Refill    venlafaxine (EFFEXOR XR) 37.5 MG extended release capsule TAKE ONE CAPSULE BY MOUTH EVERY DAY 90 capsule 0    amoxicillin-clavulanate (AUGMENTIN) 875-125 MG per tablet Take 1 tablet by mouth 2 times daily for 7 days 14 tablet 0    estradiol (ESTRACE) 0.1 MG/GM vaginal cream Apply fingertip amount to urethral caruncle twice weekly x 2 months thereafter 42.5 g 1    albuterol sulfate  (90 Base) MCG/ACT inhaler Inhale 1-2 puffs into the lungs every 6 hours as needed      anastrozole (ARIMIDEX) 1 MG tablet Take 1 mg by mouth daily      busPIRone (BUSPAR) 30 MG tablet Take 30 mg by mouth 2 times daily      docusate (COLACE, DULCOLAX) 100 MG CAPS Take 100 mg by mouth every morning      fluticasone (FLONASE) 50 MCG/ACT nasal spray 2 sprays by Nasal route daily      ibuprofen (ADVIL;MOTRIN) 800 MG tablet Take 800 mg by mouth every 8 hours as needed      loratadine (CLARITIN) 10 MG tablet Take 10 mg by mouth      polyethylene glycol (GLYCOLAX) 17 GM/SCOOP powder Take 1 packet by mouth daily as needed      calcium carbonate-vitamin D (CALTRATE) 600-400 MG-UNIT TABS per tab Take 1 tablet by mouth 2 times daily       No current facility-administered medications for this visit.         Current Problem List:   Patient Active Problem List   Diagnosis    Rosacea    Severe obesity ([address]    URI, acute    Acute maxillary sinusitis    Depression    Mild depression ([address]    Allergic rhinitis due to pollen    Malignant neoplasm of left female breast, unspecified estrogen receptor status, unspecified site of breast ([address]    Axillary lymphadenopathy       Social History:   Social History     Tobacco Use    Smoking status: Never Smoker    Smokeless tobacco: Never Used   Substance Use Topics    Alcohol use: Yes     Alcohol/week: 0.0 standard drinks       Family History:   Family History   Problem Relation Age of Onset    Cancer Mother     Alzheimer's Disease Father        Surgical History:  Past Surgical History:   Procedure Laterality Date    BREAST RECONSTRUCTION Bilateral     BREAST RECONSTRUCTION Bilateral 02/15/2022     SECTION      MASTECTOMY, BILATERAL Bilateral 10/2020    MASTECTOMY, BILATERAL Bilateral 10/2020    bilateral mastectomy and reconstruction for cancer (L)    TONSILLECTOMY AND ADENOIDECTOMY      TUBAL LIGATION      WISDOM TOOTH EXTRACTION         ROS  Review of Systems   Constitutional: Negative for fever. Genitourinary: Negative for dysuria and frequency. Skin:        Painful irritation - only painful after sex       There were no vitals taken for this visit. There is no height or weight on file to calculate BMI. Patient-Reported Vitals 2022   Patient-Reported Weight 192lbs   Patient-Reported Height 5ft 3in   Patient-Reported Temperature subjective afebrile      Physical Exam    Physical Exam  Vitals and nursing note reviewed. HENT:      Head: Normocephalic. Eyes:      Conjunctiva/sclera: Conjunctivae normal.   Pulmonary:      Effort: Pulmonary effort is normal.   Musculoskeletal:      Cervical back: Neck supple. Skin:     Coloration: Skin is not jaundiced or pale. Neurological:      Mental Status: She is alert. Mental status is at baseline. Psychiatric:         Mood and Affect: Mood normal.         Thought Content: Thought content normal.          There are no Patient Instructions on file for this visit. I have reviewed the patient's past medical history, social history and family history and vitals.   We have discussed treatment plan and follow up and given patient instructions. Patient's questions are answered and we will follow up as indicated. No follow-ups on file. Ann Nguyen PA-C  2:57 PM  6/10/2022

## 2022-06-16 RX ORDER — BUSPIRONE HYDROCHLORIDE 30 MG/1
TABLET ORAL
Qty: 60 TABLET | Refills: 1 | Status: SHIPPED | OUTPATIENT
Start: 2022-06-16 | End: 2022-08-29 | Stop reason: SDUPTHER

## 2022-06-27 RX ORDER — VENLAFAXINE HYDROCHLORIDE 75 MG/1
CAPSULE, EXTENDED RELEASE ORAL
Qty: 30 CAPSULE | Refills: 1 | Status: SHIPPED | OUTPATIENT
Start: 2022-06-27 | End: 2022-11-04 | Stop reason: SDUPTHER

## 2022-06-27 RX ORDER — VENLAFAXINE HYDROCHLORIDE 37.5 MG/1
CAPSULE, EXTENDED RELEASE ORAL
Qty: 30 CAPSULE | Refills: 1 | Status: SHIPPED | OUTPATIENT
Start: 2022-06-27 | End: 2022-11-04 | Stop reason: SDUPTHER

## 2022-07-12 ENCOUNTER — TELEMEDICINE (OUTPATIENT)
Dept: FAMILY MEDICINE CLINIC | Facility: CLINIC | Age: 57
End: 2022-07-12
Payer: COMMERCIAL

## 2022-07-12 DIAGNOSIS — M17.11 PRIMARY OSTEOARTHRITIS OF RIGHT KNEE: ICD-10-CM

## 2022-07-12 DIAGNOSIS — M25.561 CHRONIC PAIN OF RIGHT KNEE: Primary | ICD-10-CM

## 2022-07-12 DIAGNOSIS — G89.29 CHRONIC PAIN OF RIGHT KNEE: Primary | ICD-10-CM

## 2022-07-12 PROCEDURE — 99213 OFFICE O/P EST LOW 20 MIN: CPT | Performed by: PHYSICIAN ASSISTANT

## 2022-07-12 NOTE — PATIENT INSTRUCTIONS
slowly. 4. Relax for up to 10 seconds between repetitions. 5. Repeat 8 to 12 times. 6. Switch legs and repeat steps 1 through 5, even if only one knee is sore. Active knee flexion    1. Lie on your stomach with your knees straight. If your kneecap is uncomfortable, roll up a washcloth and put it under your leg just above your kneecap. 2. Lift the foot of your affected leg by bending the knee so that you bring the foot up toward your buttock. If this motion hurts, try it without bending your knee quite as far. This may help you avoid any painful motion. 3. Slowly move your leg up and down. 4. Repeat 8 to 12 times. 5. Switch legs and repeat steps 1 through 4, even if only one knee is sore. Quadriceps stretch (facedown)    1. Lie flat on your stomach, and rest your face on the floor. 2. Wrap a towel or belt strap around the lower part of your affected leg. Then use the towel or belt strap to slowly pull your heel toward your buttock until you feel a stretch. 3. Hold for about 15 to 30 seconds, then relax your leg against the towel or belt strap. 4. Repeat 2 to 4 times. 5. Switch legs and repeat steps 1 through 4, even if only one knee is sore. Stationary exercise bike    1. If you do not have a stationary exercise bike at home, you can find one to ride at your local health club or community center. 2. Adjust the height of the bike seat so that your knee is slightly bent when your leg is extended downward. If your knee hurts when the pedal reaches the top, you can raise the seat so that your knee does not bend as much. 3. Start slowly. At first, try to do 5 to 10 minutes of cycling with little to no resistance. Then increase your time and the resistance bit by bit until you can do 20 to 30 minutes without pain. 4. If you start to have pain, rest your knee until your pain gets back to the level that is normal for you. Or cycle for less time or with less effort.   Follow-up care is a key part of your treatment and safety. Be sure to make and go to all appointments, and call your doctor if you are having problems. It's also a good idea to know your test results and keep alist of the medicines you take. Where can you learn more? Go to https://corbin.Fiteeza. org and sign in to your [employer] account. Enter C159 in the [employer] box to learn more about \"Knee Arthritis: Exercises. \"     If you do not have an account, please click on the \"Sign Up Now\" link. Current as of: March 9, 2022               Content Version: 13.3  © 2006-2022 [employer]. Care instructions adapted under license by [address] If you have questions about a medical condition or this instruction, always ask your healthcare professional. [address] any warranty or liability for your use of this information. Knee Arthritis: Care Instructions  Your Care Instructions     Knee arthritis is a breakdown of the cartilage that cushions your knee joint. When the cartilage wears down, your bones rub against each other. This causespain and stiffness. Knee arthritis tends to get worse with time. Treatment for knee arthritis involves reducing pain, making the leg muscles stronger, and staying at a healthy body weight. The treatment usually does not improve the health of the cartilage, but it can reduce pain and improve howwell your knee works. You can take simple measures to protect your knee joints, ease your pain, andhelp you stay active. Follow-up care is a key part of your treatment and safety. Be sure to make and go to all appointments, and call your doctor if you are having problems. It's also a good idea to know your test results and keep alist of the medicines you take. How can you care for yourself at home?  Know that knee arthritis will cause more pain on some days than on others.  Stay at a healthy weight. Lose weight if you are overweight.  When you stand up, the pressure on your knees from every pound of body weight is multiplied four times. So if you lose 10 pounds, you will reduce the pressure on your knees by 40 pounds.  Talk to your doctor or physical therapist about exercises that will help ease joint pain. ? Stretch to help prevent stiffness and to prevent injury before you exercise. You may enjoy gentle forms of yoga to help keep your knee joints and muscles flexible. ? Walk instead of jog.  ? Ride a bike. This makes your thigh muscles stronger and takes pressure off your knee. ? Wear well-fitting and comfortable shoes. ? Exercise in chest-deep water. This can help you exercise longer with less pain. ? Avoid exercises that include squatting or kneeling. They can put a lot of strain on your knees. ? Talk to your doctor to make sure that the exercise you do is not making the arthritis worse.  Do not sit for long periods of time. Try to walk once in a while to keep your knee from getting stiff.  Ask your doctor or physical therapist whether shoe inserts may reduce your arthritis pain.  If you can afford it, get new athletic shoes at least every year. This can help reduce the strain on your knees.  Use a device to help you do everyday activities. ? A cane or walking stick can help you keep your balance when you walk. Hold the cane or walking stick in the hand opposite the painful knee. ? If you feel like you may fall when you walk, try using crutches or a front-wheeled walker. These can prevent falls that could cause more damage to your knee. ? A knee brace may help keep your knee stable and prevent pain. ? You also can use other things to make life easier, such as a higher toilet seat and handrails in the bathtub or shower.  Take pain medicines exactly as directed. ? Do not wait until you are in severe pain. You will get better results if you take it sooner.   ? If you are not taking a prescription pain medicine, take an over-the-counter

## 2022-07-12 NOTE — PROGRESS NOTES
Kaden Gomes is being evaluated by a Virtual Visit (video visit) encounter to address concerns as mentioned above. A caregiver was present when appropriate. Due to this being a TeleHealth encounter (During NLP-67 public health emergency), evaluation of the following organ systems was limited: Vitals/Constitutional/EENT/Resp/CV/GI//MS/Neuro/Skin/Heme-Lymph-Imm. Pursuant to the emergency declaration under the [address] [place] [address], [address] [place] [address] and the [employer] Act, this Virtual Visit was conducted with patient's (and/or legal guardian's) consent, to reduce the patient's risk of exposure to COVID-19 and provide necessary medical care. The patient (and/or legal guardian) has also been advised to contact this office for worsening conditions or problems, and seek emergency medical treatment and/or call 911 if deemed necessary. Patient identification was verified at the start of the visit: yes    Services were provided through a video synchronous discussion virtually to substitute for in-person clinic visit. Patient and provider were located at their individual homes/offices. Kaden Gomes is a 59 y.o. female who presents as an established patient, here for evaluation of the following chief complaint(s):     Chief Complaint   Patient presents with    Knee Pain     right x 6 wks, no known injury, slight edema, xray on 6/15/22-negative       SUBJECTIVE/OBJECTIVE:  Kaden Gomes is a very pleasant 59 y.o. female who presents with a complaint of right knee pain that started 2 months ago, without any known injury or trauma. The pain is gradually worsening. She reports joint stiffness  She went to an UC several weeks ago and had an XR performed, which demonstrated tricompartmental arthrosis/arthritis. She has no history of knee problems. She has been taking naproxen and tylenol for pain relief, which does help a little.  She reports painful walking and mild swelling, occasional clicking/popping of the joint. She has been unable to walk for exercise due to the worsening pain. PAST MEDICAL HISTORY  Current Outpatient Medications   Medication Sig    venlafaxine (EFFEXOR XR) 75 MG extended release capsule TAKE ONE CAPSULE BY MOUTH EVERY DAY    busPIRone (BUSPAR) 30 MG tablet TAKE ONE TABLET BY MOUTH TWICE A DAY    calcium carbonate-vitamin D (CALTRATE) 600-400 MG-UNIT TABS per tab Take 1 tablet by mouth 2 times daily    estradiol (ESTRACE) 0.1 MG/GM vaginal cream Apply fingertip amount to urethral caruncle twice weekly x 2 months thereafter    albuterol sulfate  (90 Base) MCG/ACT inhaler Inhale 1-2 puffs into the lungs every 6 hours as needed    anastrozole (ARIMIDEX) 1 MG tablet Take 1 mg by mouth daily    docusate (COLACE, DULCOLAX) 100 MG CAPS Take 100 mg by mouth every morning    fluticasone (FLONASE) 50 MCG/ACT nasal spray 2 sprays by Nasal route daily    ibuprofen (ADVIL;MOTRIN) 800 MG tablet Take 800 mg by mouth every 8 hours as needed    loratadine (CLARITIN) 10 MG tablet Take 10 mg by mouth    polyethylene glycol (GLYCOLAX) 17 GM/SCOOP powder Take 1 packet by mouth daily as needed    venlafaxine (EFFEXOR XR) 37.5 MG extended release capsule TAKE ONE CAPSULE BY MOUTH EVERY DAY (Patient not taking: Reported on 7/12/2022)     No current facility-administered medications for this visit.       No Known Allergies   Past Medical History:   Diagnosis Date    Cancer [facility])     left breast cancer- had mastectomy, chemotherapy, and radiation    Contact dermatitis and other eczema, due to unspecified cause     Depression     Pul[facility] ([facility] 10/2020     Family History   Problem Relation Age of Onset    Cancer Mother     Alzheimer's Disease Father      Social History     Socioeconomic History    Marital status:      Spouse name: Not on file    Number of children: Not on file    Years of education: Not on file    Highest education level: Not on file   Occupational History    Not on file   Tobacco Use    Smoking status: Never Smoker    Smokeless tobacco: Never Used   Vaping Use    Vaping Use: Never used   Substance and Sexual Activity    Alcohol use: Yes     Alcohol/week: 0.0 standard drinks    Drug use: No    Sexual activity: Not on file   Other Topics Concern    Not on file   Social History Narrative    Not on file     Social Determinants of Health     Financial Resource Strain:     Difficulty of Paying Living Expenses: Not on file   Food Insecurity:     Worried About Running Out of Food in the Last Year: Not on file    Elsa of Food in the Last Year: Not on file   Transportation Needs:     Lack of Transportation (Medical): Not on file    Lack of Transportation (Non-Medical):  Not on file   Physical Activity:     Days of Exercise per Week: Not on file    Minutes of Exercise per Session: Not on file   Stress:     Feeling of Stress : Not on file   Social Connections:     Frequency of Communication with Friends and Family: Not on file    Frequency of Social Gatherings with Friends and Family: Not on file    Attends Church Services: Not on file    Active Member of [address] [place] [address] [employer] or Organizations: Not on file    Attends Club or Organization Meetings: Not on file    Marital Status: Not on file   Intimate Partner Violence:     Fear of Current or Ex-Partner: Not on file    Emotionally Abused: Not on file    Physically Abused: Not on file    Sexually Abused: Not on file   Housing Stability:     Unable to Pay for Housing in the Last Year: Not on file    Number of [place] in the Last Year: Not on file    Unstable Housing in the Last Year: Not on file     Past Surgical History:   Procedure Laterality Date    BREAST RECONSTRUCTION Bilateral     BREAST RECONSTRUCTION Bilateral 02/15/2022     SECTION      MASTECTOMY, BILATERAL Bilateral 10/2020    MASTECTOMY, BILATERAL Bilateral 10/2020    bilateral mastectomy and reconstruction for cancer (L)    TONSILLECTOMY AND ADENOIDECTOMY      TUBAL LIGATION      WISDOM TOOTH EXTRACTION         Review of Systems   Constitutional: Positive for activity change. Negative for chills and fever. Musculoskeletal: Positive for arthralgias and joint swelling. Right knee pain   Neurological: Negative for numbness. VITAL SIGNS:  There were no vitals taken for this visit. Physical Exam  Vitals reviewed. Constitutional:       Appearance: Normal appearance. She is normal weight. HENT:      Head: Normocephalic and atraumatic. Right Ear: External ear normal.      Left Ear: External ear normal.   Eyes:      Conjunctiva/sclera: Conjunctivae normal.   Pulmonary:      Effort: Pulmonary effort is normal.   Neurological:      Mental Status: She is alert and oriented to person, place, and time. Psychiatric:         Attention and Perception: Attention normal.         Mood and Affect: Mood and affect normal.         Speech: Speech normal.         Behavior: Behavior normal.          ASSESSMENT AND PLAN:  Varghese Edwards was seen today for knee pain. Diagnoses and all orders for this visit:    Chronic pain of right knee  -     Alina Stanton Dr    Primary osteoarthritis of right knee  -     Alina Preston application of ice 04-65 minutes BID, continue otc nsaid and tylenol for pain relief, home PT exercises (2-3 days per week). Will also refer to ortho to discuss joint injection. An electronic signature was used to authenticate this note.   -- Rocío Mao PA-C       Time spent face to face with patient, reviewing previous notes/labs/imaging, and documenting on the day of the visit was 20 minutes

## 2022-07-28 ENCOUNTER — OFFICE VISIT (OUTPATIENT)
Dept: UROGYNECOLOGY | Age: 57
End: 2022-07-28
Payer: COMMERCIAL

## 2022-07-28 VITALS — BODY MASS INDEX: 33.77 KG/M2 | HEIGHT: 63 IN | WEIGHT: 190.6 LBS

## 2022-07-28 DIAGNOSIS — N36.2 URETHRAL CARUNCLE: Primary | ICD-10-CM

## 2022-07-28 LAB
BILIRUBIN, URINE, POC: NEGATIVE
BLOOD URINE, POC: NEGATIVE
GLUCOSE URINE, POC: NEGATIVE
KETONES, URINE, POC: NEGATIVE
LEUKOCYTE ESTERASE, URINE, POC: NEGATIVE
NITRITE, URINE, POC: NEGATIVE
PH, URINE, POC: 8.5 (ref 4.6–8)
PROTEIN,URINE, POC: NEGATIVE
SPECIFIC GRAVITY, URINE, POC: 1.02 (ref 1–1.03)
URINALYSIS CLARITY, POC: CLEAR
URINALYSIS COLOR, POC: YELLOW
UROBILINOGEN, POC: 0.2

## 2022-07-28 PROCEDURE — 51701 INSERT BLADDER CATHETER: CPT | Performed by: OBSTETRICS & GYNECOLOGY

## 2022-07-28 PROCEDURE — 99204 OFFICE O/P NEW MOD 45 MIN: CPT | Performed by: OBSTETRICS & GYNECOLOGY

## 2022-07-28 PROCEDURE — 81003 URINALYSIS AUTO W/O SCOPE: CPT | Performed by: OBSTETRICS & GYNECOLOGY

## 2022-07-28 NOTE — ASSESSMENT & PLAN NOTE
She is on anti-estrogen for breast cancer. She is using estrogen cream 2 times per week. The caruncle is minimal and only bothersome during sex when exposed to semen. 1. She is going to talk to onco about using E2 every night for a week or two and than backing back off to 2 times per week    2. I also advised her to try using condoms during sex to prevent the exposure to semen. She can use a water based lubricant (list provided) and condoms without spermicide.      3. The caruncle is extremely small

## 2022-07-28 NOTE — PATIENT INSTRUCTIONS
Water based lubricant with condom (no spermicide)    Water Based   Astroglide   Just Like Me   K-Y Jelly   Pre-Seed   Slippery Stuff   Liquid Silk     Silicone Based   Astroglide X   ID Millennium   K-Y intrigue   Pink    Moisturizers   Replens   Me Again   Vagisil   Feminease   LANNY Fernandez

## 2022-07-28 NOTE — PROGRESS NOTES
[facility] UROGYNECOLOGY  [address]  Dept: [phone]        PCP:  None Provider    9/7/2022        HPI:  I am being asked to see this patient in consultation by Dr. Octavia Hinojosa  for New Patient (Urethral caruncle)    Patient is here today for a urethral caruncle. She states the pain occurs mostly during intercourse. She states this started in February of 2022. She has since been to her primary care doctor and they prescribed her vaginal estrogen. She says she has been using this two times a week, but it has not been helpful. Patient states she is not having any other symptoms, other than pain. She voids 6 times during the day. She voids 0 times over night. She has 7 BM per week, and does not strain. She drinks 3-4 caffeine drinks beverages per day. coffee  She uses 1 artificial sweeteners per day. She drinks 4 alcoholic beverages per week. She has had pelvic surgery in the past. Tubal ligation  Her last PAP: unsure  Her last Colonoscopy: unsure  Her last Mammogram: feb 2020- patient was diagnosed with breast cancer in march of 2020    She does not have a history of DM. No results found for: LABA1C  No results found for: EAG    She does not have a history of sleep apnea. Tobacco: No    Sexual History: has sex with her . Notes were reviewed from the referring provider Dr Octavia Hinojosa.   Results were reviewed from prior tests:     Results for orders placed or performed in visit on 07/28/22   AMB POC URINALYSIS DIP STICK AUTO W/O MICRO   Result Value Ref Range    Color, Urine, POC yellow     Clarity, Urine, POC clear     Glucose, Urine, POC Negative Negative    Bilirubin, Urine, POC Negative Negative    Ketones, Urine, POC Negative Negative    Specific Gravity, Urine, POC 1.020 1.001 - 1.035    Blood, Urine, POC negative Negative    pH, Urine, POC 8.5 (A) 4.6 - 8.0    Protein, Urine, POC Negative Negative    Urobilinogen, POC 0.2     Nitrate, Urine, POC

## 2022-08-11 ENCOUNTER — OFFICE VISIT (OUTPATIENT)
Dept: ORTHOPEDIC SURGERY | Age: 57
End: 2022-08-11

## 2022-08-11 DIAGNOSIS — M25.561 RIGHT KNEE PAIN, UNSPECIFIED CHRONICITY: Primary | ICD-10-CM

## 2022-08-11 RX ORDER — METHYLPREDNISOLONE ACETATE 40 MG/ML
40 INJECTION, SUSPENSION INTRA-ARTICULAR; INTRALESIONAL; INTRAMUSCULAR; SOFT TISSUE ONCE
Status: SHIPPED | OUTPATIENT
Start: 2022-08-11

## 2022-08-11 NOTE — PROGRESS NOTES
Patient ID:  Sofia Dyson  [id number]  1 y.o.  1965    Today: 2022          Chief Complaint:  right knee pain   HPI:       Sofia Dyson is a 59 y.o. female seen for evaluation and treatment of right knee pain. Onset was 3 months ago. The patient reports occasional locking and catching in the knee with some associated pain. The pain has gotten severe to the point she had to use a cane. She is not ambulating with one today. They have not had significant issues with the knee in the past. They do do not report an injury to the knee. They have attempted conservative treatment up to this point including NSAIDS, Tylenol, and activity modification with minimal relief. Prior procedures on the knee include none. Past Medical History:  Past Medical History:   Diagnosis Date    Cancer [facility])     left breast cancer- had mastectomy, chemotherapy, and radiation    Contact dermatitis and other eczema, due to unspecified cause     Depression     Pulmonary embolus, left ([address] 10/2020       Past Surgical History:  Past Surgical History:   Procedure Laterality Date    BREAST RECONSTRUCTION Bilateral     BREAST RECONSTRUCTION Bilateral 02/15/2022     SECTION      LYMPH NODE DISSECTION      MASTECTOMY, BILATERAL Bilateral 10/2020    MASTECTOMY, BILATERAL Bilateral 10/2020    bilateral mastectomy and reconstruction for cancer (L)    TONSILLECTOMY AND ADENOIDECTOMY      TUBAL LIGATION      WISDOM TOOTH EXTRACTION          Medications:     Prior to Admission medications    Medication Sig Start Date End Date Taking?  Authorizing Provider   venlafaxine (EFFEXOR XR) 75 MG extended release capsule TAKE ONE CAPSULE BY MOUTH EVERY DAY 22   Cora Hernandez PA-C   venlafaxine (EFFEXOR XR) 37.5 MG extended release capsule TAKE ONE CAPSULE BY MOUTH EVERY DAY  Patient not taking: No sig reported 22   Cora Hernandez PA-C   busPIRone (BUSPAR) 30 MG tablet TAKE ONE TABLET BY MOUTH TWICE A DAY 6/16/22   Milena Harrell PA-C   calcium carbonate-vitamin D (CALTRATE) 600-400 MG-UNIT TABS per tab Take 1 tablet by mouth 2 times daily    Historical Provider, MD   estradiol (ESTRACE) 0.1 MG/GM vaginal cream Apply fingertip amount to urethral caruncle twice weekly x 2 months thereafter 6/8/22   Elizabeth Sandoval PA-C   albuterol sulfate  (90 Base) MCG/ACT inhaler Inhale 1-2 puffs into the lungs every 6 hours as needed 10/23/16   Ar Automatic Reconciliation   anastrozole (ARIMIDEX) 1 MG tablet Take 1 mg by mouth daily 9/21/21   Ar Automatic Reconciliation   docusate (COLACE, DULCOLAX) 100 MG CAPS Take 100 mg by mouth every morning    Ar Automatic Reconciliation   fluticasone (FLONASE) 50 MCG/ACT nasal spray 2 sprays by Nasal route daily    Ar Automatic Reconciliation   ibuprofen (ADVIL;MOTRIN) 800 MG tablet Take 800 mg by mouth every 8 hours as needed 4/25/19   Ar Automatic Reconciliation   loratadine (CLARITIN) 10 MG tablet Take 10 mg by mouth    Ar Automatic Reconciliation   polyethylene glycol (GLYCOLAX) 17 GM/SCOOP powder Take 1 packet by mouth daily as needed    Ar Automatic Reconciliation       Family History:     Family History   Problem Relation Age of Onset    Cancer Mother     Alzheimer's Disease Father        Social History:      Social History     Tobacco Use    Smoking status: Never    Smokeless tobacco: Never   Substance Use Topics    Alcohol use: Yes     Alcohol/week: 0.0 standard drinks         Allergies:    No Known Allergies     Vitals: There were no vitals taken for this visit. ROS:   Review of Systems         Objective:   General: Patient is awake and in no acute distress  Psych: Mood and affect appropriate  HEENT: Normocephalic. Atramatic. Pupils equal, round and reactive. Sclera normal.   Neck: Supple without obvious mass   Chest: Symmetric  Lungs:  Breathing non-labored. No tachypnea noted. Abdomen: Soft on gross examination without obvious distention.   Neuro: No obvious neurologic deficit. Grossly moves bilateral upper extremities without motor or sensory deficits. No gross weakness noted in the lower extremities. No hyporeflexia or hyperreflexia noted. Vascular: No gross arterial or venous deficiency noted. DP and PT pulses are palpable in the lower extremities  Lymphatic: No lymphedema noted in the lower extremities. Skin: No prior incisions noted about the rleft knee. No obvious rashes noted about the area. No skin changes noted about the knee or about the adjacent thigh or leg. Extremities:  Patient ambulates with an antalgic gait. Tenderness along the medial joint line. There is some pain with ROM of the knee. Range of motion is 0 to 110 degrees. Trace effusion noted in the knee. Patellofemoral crepitus is absent. There is  pain with Petey's testing. I cannot elicit on obvious lock with this maneuver but the maneuver does cause pain. Distally the patient shows no neurologic deficit. Xrays:     XR Knee 3/4 View  Views: AP knee, skiers PA, lateral knee, sunrise view right knee  Clinical indication: Right Knee Pain   Findings: No evidence of osteoarthritic changes. Joint space appears to be well preserved. No evidence of fracture or suggestion of obvious pathology  Impression: Normal appearing right knee    JR GALEANA - CNP        Assessment:   1. Right knee pain     Plan:  Given the patient clinical presentation, including primary complaint and physical exam paired with fairly unremarkable xrays, I recommend MRI of the right knee to evaluate for internal derangement that may be responsible for the patient's pain and/or mechanical symptoms. Will follow-up with patient to discuss results.         Signed By: JR Chan CNP  August 11, 2022 6

## 2022-08-25 ENCOUNTER — OFFICE VISIT (OUTPATIENT)
Dept: ORTHOPEDIC SURGERY | Age: 57
End: 2022-08-25
Payer: COMMERCIAL

## 2022-08-25 DIAGNOSIS — M25.569 KNEE PAIN, UNSPECIFIED CHRONICITY, UNSPECIFIED LATERALITY: Primary | ICD-10-CM

## 2022-08-25 PROCEDURE — 20610 DRAIN/INJ JOINT/BURSA W/O US: CPT | Performed by: ORTHOPAEDIC SURGERY

## 2022-08-25 PROCEDURE — 99213 OFFICE O/P EST LOW 20 MIN: CPT | Performed by: ORTHOPAEDIC SURGERY

## 2022-08-25 RX ORDER — METHYLPREDNISOLONE ACETATE 40 MG/ML
40 INJECTION, SUSPENSION INTRA-ARTICULAR; INTRALESIONAL; INTRAMUSCULAR; SOFT TISSUE ONCE
Qty: 1 ML | Refills: 0
Start: 2022-08-25 | End: 2022-08-25

## 2022-08-25 NOTE — PROGRESS NOTES
Patient ID:  Di Rome  [id number]  82 y.o.  1965    Today: 2022          Chief Complaint:  Right Knee pain and locking    HPI:       Di Rome is a 59 y.o. female seen for followup of right knee MRI. Review of the MRI shows medial meniscus pathology along with parameniscal cyst and medial femoral condyle edema. Mild medial compartment chondromalacia. Past Medical History:  Past Medical History:   Diagnosis Date    Cancer [facility])     left breast cancer- had mastectomy, chemotherapy, and radiation    Contact dermatitis and other eczema, due to unspecified cause     Depression     Pulmonary embolus, left ([address] 10/2020       Past Surgical History:  Past Surgical History:   Procedure Laterality Date    BREAST RECONSTRUCTION Bilateral     BREAST RECONSTRUCTION Bilateral 02/15/2022     SECTION      LYMPH NODE DISSECTION      MASTECTOMY, BILATERAL Bilateral 10/2020    MASTECTOMY, BILATERAL Bilateral 10/2020    bilateral mastectomy and reconstruction for cancer (L)    TONSILLECTOMY AND ADENOIDECTOMY      TUBAL LIGATION      WISDOM TOOTH EXTRACTION          Medications:     Prior to Admission medications    Medication Sig Start Date End Date Taking?  Authorizing Provider   venlafaxine (EFFEXOR XR) 75 MG extended release capsule TAKE ONE CAPSULE BY MOUTH EVERY DAY 22   Libbie Boas, PA-C   venlafaxine (EFFEXOR XR) 37.5 MG extended release capsule TAKE ONE CAPSULE BY MOUTH EVERY DAY  Patient not taking: No sig reported 22   Libbie Boas, PA-C   busPIRone (BUSPAR) 30 MG tablet TAKE ONE TABLET BY MOUTH TWICE A DAY 22   Libbie Boas, PA-C   calcium carbonate-vitamin D (CALTRATE) 600-400 MG-UNIT TABS per tab Take 1 tablet by mouth 2 times daily    Historical Provider, MD   estradiol (ESTRACE) 0.1 MG/GM vaginal cream Apply fingertip amount to urethral caruncle twice weekly x 2 months thereafter 22   Susu Abel PA-C   albuterol sulfate  (90 Base) MCG/ACT inhaler Inhale 1-2 puffs into the lungs every 6 hours as needed 10/23/16   Ar Automatic Reconciliation   anastrozole (ARIMIDEX) 1 MG tablet Take 1 mg by mouth daily 9/21/21   Ar Automatic Reconciliation   docusate (COLACE, DULCOLAX) 100 MG CAPS Take 100 mg by mouth every morning    Ar Automatic Reconciliation   fluticasone (FLONASE) 50 MCG/ACT nasal spray 2 sprays by Nasal route daily    Ar Automatic Reconciliation   ibuprofen (ADVIL;MOTRIN) 800 MG tablet Take 800 mg by mouth every 8 hours as needed 4/25/19   Ar Automatic Reconciliation   loratadine (CLARITIN) 10 MG tablet Take 10 mg by mouth    Ar Automatic Reconciliation   polyethylene glycol (GLYCOLAX) 17 GM/SCOOP powder Take 1 packet by mouth daily as needed    Ar Automatic Reconciliation       Family History:     Family History   Problem Relation Age of Onset    Cancer Mother     Alzheimer's Disease Father        Social History:      Social History     Tobacco Use    Smoking status: Never    Smokeless tobacco: Never   Substance Use Topics    Alcohol use: Yes     Alcohol/week: 0.0 standard drinks         Allergies:    No Known Allergies     Vitals: There were no vitals taken for this visit. ROS:   Review of Systems         Objective:   General: Patient is awake and in no acute distress  Psych: Mood and affect appropriate  HEENT: Normocephalic. Atramatic. Pupils equal, round and reactive. Sclera normal.   Neck: Supple without obvious mass   Chest: Symmetric  Lungs:  Breathing non-labored. No tachypnea noted. Abdomen: Soft on gross examination without obvious distention. Neuro: No obvious neurologic deficit. Grossly moves bilateral upper extremities without motor or sensory deficits. No gross weakness noted in the lower extremities. No hyporeflexia or hyperreflexia noted. Vascular: No gross arterial or venous deficiency noted. DP and PT pulses are palpable in the lower extremities  Lymphatic: No lymphedema noted in the lower extremities.   Skin: No prior incisions noted about the rleft knee. No obvious rashes noted about the area. No skin changes noted about the knee or about the adjacent thigh or leg. Extremities:  Patient ambulates with an antalgic gait. There is some pain with ROM of the right knee. Range of motion is unchanged from prior exam. Trace effusion noted in the knee. Patellofemoral crepitus is absent. There is pain with Petey's testing. I cannot elicit on obvious lock with this maneuver but the maneuver does cause pain. Distally the patient shows no neurologic deficit. Xrays:     None        Assessment:     Right knee pain with mechanical symptoms      Plan:   We discussed MRI findings and treatment options today along with risks, benefits and alternatives of each. At this point the patient would like to proceed with Corticosteroid Injection    Treatment:    Steroid Injection - Risks, benefits, and alternatives of corticosteroid injection were discussed. We discussed risks including (but not limited to) the potential for steroid flare leading to a temporary increase in pain, skin discoloration around injection site, potential for fat necrosis at the injection site, temporary facial flushing, and potential for temporary increase in blood sugars in diabetic patients. We also discussed that no promises can be made regarding the outcomes/pain relief from the injection nor the time period that the injection may be effective. After any questions were address the patient wished to proceed with injection. After prepping the injection site the right knee was injected with 1cc Depomedrol/3cc marcaine. Patient tolerated the procedure well. Patient is instructed to ice the joint for next few days if they experience discomfort.  The patient will followup as directed or as needed        Signed By: Rosemary Diaz MD  August 25, 2022

## 2022-08-29 RX ORDER — BUSPIRONE HYDROCHLORIDE 30 MG/1
30 TABLET ORAL DAILY
Qty: 60 TABLET | Refills: 1 | Status: SHIPPED | OUTPATIENT
Start: 2022-08-29 | End: 2022-11-02 | Stop reason: SDUPTHER

## 2022-09-07 ENCOUNTER — OFFICE VISIT (OUTPATIENT)
Dept: UROGYNECOLOGY | Age: 57
End: 2022-09-07
Payer: COMMERCIAL

## 2022-09-07 DIAGNOSIS — N36.2 URETHRAL CARUNCLE: Primary | ICD-10-CM

## 2022-09-07 PROCEDURE — 99213 OFFICE O/P EST LOW 20 MIN: CPT | Performed by: OBSTETRICS & GYNECOLOGY

## 2022-09-07 NOTE — ASSESSMENT & PLAN NOTE
We are going to try and reduce the estrogen to 3 times per week. We discussed reducing the amt further to 2 if it continues to control her symptoms. She knows to call if she has any more bleeding. We discussed the concern for bleeding: too much estrogen with a uterus can cause abnormal uterine bleeding. She is not currently bleeding and I did not see any blood on exam today.

## 2022-09-07 NOTE — PROGRESS NOTES
[facility] UROGYNECOLOGY  [address]  Dept: [phone]    PCP:  None Provider    9/7/2022      HPI:  Bert Ji is here to follow up on Follow-up (Urethral caruncle )    Patient was last seen on 7/28/22 for a urethral caruncle. Patient continues to use the vaginal estrogen and is now using vaginal estrogen, once every day and is using condoms during sex. She said she is no longer having urethral pain during sex. She did notice some inner vaginal bleeding that started last Wednesday, that continued for 4 days. She said she was not having any pain and was unsure where the bleeding was coming from exactly. She is no longer having any bleeding. She has her  place the estrogen cream on the caruncle every night. She has not reduce the estrogen (down to 2-3 times per week) as we previously had discussed. No results found for this visit on 09/07/22. There were no vitals taken for this visit. Exam:   Labia: Normal  Vulva: Normal  Urethral meatus: Much improved, minimal signs of caruncle, no erythema or blood noted on exam today. Vagina: Normal vaginal tissue. No signs of bleeding  Cervix: normal      1. Urethral caruncle  Assessment & Plan: We are going to try and reduce the estrogen to 3 times per week. We discussed reducing the amt further to 2 if it continues to control her symptoms. She knows to call if she has any more bleeding. We discussed the concern for bleeding: too much estrogen with a uterus can cause abnormal uterine bleeding. She is not currently bleeding and I did not see any blood on exam today. No follow-ups on file. On this date 9/7/2022 I have spent 20 minutes reviewing previous notes, test results and face to face with the patient discussing the diagnosis and importance of compliance with the treatment plan as well as documenting on the day of the visit.                   Dora Siddiqi,

## 2022-11-02 ENCOUNTER — TELEPHONE (OUTPATIENT)
Dept: FAMILY MEDICINE CLINIC | Facility: CLINIC | Age: 57
End: 2022-11-02

## 2022-11-02 DIAGNOSIS — F41.1 GENERALIZED ANXIETY DISORDER: Primary | ICD-10-CM

## 2022-11-02 RX ORDER — BUSPIRONE HYDROCHLORIDE 30 MG/1
30 TABLET ORAL 2 TIMES DAILY
Qty: 60 TABLET | Refills: 1 | Status: SHIPPED | OUTPATIENT
Start: 2022-11-02

## 2022-11-02 NOTE — TELEPHONE ENCOUNTER
Patient's  called, back in June pt was given Buspar 2 times a day, but when Israel Irizarry refilled it he only did once a day and pt is going to run out. Can she get this refilled for twice a day? She has an appt on 11/8 with Elyssa Becker.

## 2022-11-04 RX ORDER — VENLAFAXINE HYDROCHLORIDE 75 MG/1
CAPSULE, EXTENDED RELEASE ORAL
Qty: 7 CAPSULE | Refills: 0 | Status: SHIPPED | OUTPATIENT
Start: 2022-11-04 | End: 2022-11-08 | Stop reason: SDUPTHER

## 2022-11-07 SDOH — HEALTH STABILITY: PHYSICAL HEALTH: ON AVERAGE, HOW MANY MINUTES DO YOU ENGAGE IN EXERCISE AT THIS LEVEL?: 0 MIN

## 2022-11-07 SDOH — HEALTH STABILITY: PHYSICAL HEALTH: ON AVERAGE, HOW MANY DAYS PER WEEK DO YOU ENGAGE IN MODERATE TO STRENUOUS EXERCISE (LIKE A BRISK WALK)?: 5 DAYS

## 2022-11-08 ENCOUNTER — OFFICE VISIT (OUTPATIENT)
Dept: FAMILY MEDICINE CLINIC | Facility: CLINIC | Age: 57
End: 2022-11-08
Payer: COMMERCIAL

## 2022-11-08 VITALS
HEART RATE: 72 BPM | OXYGEN SATURATION: 98 % | DIASTOLIC BLOOD PRESSURE: 75 MMHG | TEMPERATURE: 97.4 F | SYSTOLIC BLOOD PRESSURE: 125 MMHG | HEIGHT: 63 IN | WEIGHT: 183.4 LBS | BODY MASS INDEX: 32.5 KG/M2

## 2022-11-08 DIAGNOSIS — F33.1 MAJOR DEPRESSIVE DISORDER, RECURRENT, MODERATE (HCC): ICD-10-CM

## 2022-11-08 DIAGNOSIS — F33.1 MODERATE EPISODE OF RECURRENT MAJOR DEPRESSIVE DISORDER (HCC): ICD-10-CM

## 2022-11-08 DIAGNOSIS — F33.0 MAJOR DEPRESSIVE DISORDER, RECURRENT, MILD (HCC): ICD-10-CM

## 2022-11-08 PROBLEM — F33.9 MAJOR DEPRESSIVE DISORDER, RECURRENT, UNSPECIFIED (HCC): Status: ACTIVE | Noted: 2022-11-08

## 2022-11-08 PROCEDURE — 99213 OFFICE O/P EST LOW 20 MIN: CPT | Performed by: FAMILY MEDICINE

## 2022-11-08 RX ORDER — VENLAFAXINE HYDROCHLORIDE 150 MG/1
CAPSULE, EXTENDED RELEASE ORAL
Qty: 60 CAPSULE | Refills: 2 | Status: SHIPPED | OUTPATIENT
Start: 2022-11-08 | End: 2023-11-07

## 2022-11-08 ASSESSMENT — PATIENT HEALTH QUESTIONNAIRE - PHQ9
8. MOVING OR SPEAKING SO SLOWLY THAT OTHER PEOPLE COULD HAVE NOTICED. OR THE OPPOSITE, BEING SO FIGETY OR RESTLESS THAT YOU HAVE BEEN MOVING AROUND A LOT MORE THAN USUAL: 0
3. TROUBLE FALLING OR STAYING ASLEEP: 1
4. FEELING TIRED OR HAVING LITTLE ENERGY: 1
SUM OF ALL RESPONSES TO PHQ QUESTIONS 1-9: 7
SUM OF ALL RESPONSES TO PHQ QUESTIONS 1-9: 7
2. FEELING DOWN, DEPRESSED OR HOPELESS: 1
SUM OF ALL RESPONSES TO PHQ QUESTIONS 1-9: 7
10. IF YOU CHECKED OFF ANY PROBLEMS, HOW DIFFICULT HAVE THESE PROBLEMS MADE IT FOR YOU TO DO YOUR WORK, TAKE CARE OF THINGS AT HOME, OR GET ALONG WITH OTHER PEOPLE: 1
1. LITTLE INTEREST OR PLEASURE IN DOING THINGS: 1
SUM OF ALL RESPONSES TO PHQ9 QUESTIONS 1 & 2: 2
6. FEELING BAD ABOUT YOURSELF - OR THAT YOU ARE A FAILURE OR HAVE LET YOURSELF OR YOUR FAMILY DOWN: 1
5. POOR APPETITE OR OVEREATING: 1
SUM OF ALL RESPONSES TO PHQ QUESTIONS 1-9: 7
9. THOUGHTS THAT YOU WOULD BE BETTER OFF DEAD, OR OF HURTING YOURSELF: 0
7. TROUBLE CONCENTRATING ON THINGS, SUCH AS READING THE NEWSPAPER OR WATCHING TELEVISION: 1

## 2022-11-08 ASSESSMENT — ANXIETY QUESTIONNAIRES
7. FEELING AFRAID AS IF SOMETHING AWFUL MIGHT HAPPEN: 1
IF YOU CHECKED OFF ANY PROBLEMS ON THIS QUESTIONNAIRE, HOW DIFFICULT HAVE THESE PROBLEMS MADE IT FOR YOU TO DO YOUR WORK, TAKE CARE OF THINGS AT HOME, OR GET ALONG WITH OTHER PEOPLE: SOMEWHAT DIFFICULT
GAD7 TOTAL SCORE: 7
6. BECOMING EASILY ANNOYED OR IRRITABLE: 1
5. BEING SO RESTLESS THAT IT IS HARD TO SIT STILL: 1
3. WORRYING TOO MUCH ABOUT DIFFERENT THINGS: 1
4. TROUBLE RELAXING: 1
2. NOT BEING ABLE TO STOP OR CONTROL WORRYING: 1
1. FEELING NERVOUS, ANXIOUS, OR ON EDGE: 1

## 2022-11-08 ASSESSMENT — ENCOUNTER SYMPTOMS
CHEST TIGHTNESS: 0
COUGH: 0

## 2022-11-08 NOTE — PROGRESS NOTES
[facility]  Balaji Meyer  Phone [phone]  Fax:  [phone]    Patient: Manny Gonzalez  YOB: 1965  Patient Age 59 y.o. Patient sex: female  Medical Record:  [id number]  Visit Date: 11/08/22    [facility] Note     Chief Complaint   Patient presents with    New Patient     Would like  to get her Effexor increased       History of Present Illness:  Pt here for f/u on depression - Would like to increase her effexor. Was on zoloft for yrs. Zoloft just stopped working. Effexor is helping. Just feels more stressors - one being work. Works [employer] at [employer]. 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 Pt had chemo and radiation and that affected her memory. On arimidex still   Is on her feet all day. Pt is on buspar also bid - 30mg bid. Caruncle - urethral - Pain improved now. She is on estrogen vaginal cream 2-3 x/week. Followed by [facility] - Dr Escobar Stevens - for a caruncle in the urethral area. Last seen Sept 7. HTN - Elevated today. No meds.                Allergies:No Known Allergies    Current Medications:   Medications marked \"taking\" at this time:    Current Outpatient Medications:     venlafaxine (EFFEXOR XR) 150 MG extended release capsule, TAKE ONE CAPSULE BY MOUTH EVERY DAY, Disp: 60 capsule, Rfl: 2    busPIRone (BUSPAR) 30 MG tablet, Take 30 mg by mouth in the morning and at bedtime, Disp: 60 tablet, Rfl: 1    calcium carbonate-vitamin D (CALTRATE) 600-400 MG-UNIT TABS per tab, Take 1 tablet by mouth 2 times daily, Disp: , Rfl:     estradiol (ESTRACE) 0.1 MG/GM vaginal cream, Apply fingertip amount to urethral caruncle twice weekly x 2 months thereafter, Disp: 42.5 g, Rfl: 1    albuterol sulfate  (90 Base) MCG/ACT inhaler, Inhale 1-2 puffs into the lungs every 6 hours as needed, Disp: , Rfl:     anastrozole (ARIMIDEX) 1 MG tablet, Take 1 mg by mouth daily, Disp: , Rfl:     docusate (COLACE, DULCOLAX) 100 MG CAPS, Take 100 mg by mouth every morning, Disp: , Rfl:     fluticasone (FLONASE) 50 MCG/ACT nasal spray, 2 sprays by Nasal route daily, Disp: , Rfl:     ibuprofen (ADVIL;MOTRIN) 800 MG tablet, Take 800 mg by mouth every 8 hours as needed, Disp: , Rfl:     loratadine (CLARITIN) 10 MG tablet, Take 10 mg by mouth, Disp: , Rfl:     polyethylene glycol (GLYCOLAX) 17 GM/SCOOP powder, Take 1 packet by mouth daily as needed (Patient not taking: Reported on 2022), Disp: , Rfl:     Current Problem List:   Patient Active Problem List   Diagnosis    Rosacea    Severe obesity ([facility] [address]    URI, acute    Acute maxillary sinusitis    Depression    Mild depression    Allergic rhinitis due to pollen    Malignant neoplasm of left female breast, unspecified estrogen receptor status, unspecified site of breast (HCC)    Axillary lymphadenopathy    Urethral caruncle    Major depressive disorder, recurrent, mild    Major depressive disorder, recurrent, moderate    Major depressive disorder, recurrent, unspecified       Social History:   Social History     Tobacco Use    Smoking status: Never    Smokeless tobacco: Never   Substance Use Topics    Alcohol use: Yes     Alcohol/week: 0.0 standard drinks       Family History:   Family History   Problem Relation Age of Onset    Cancer Mother     Alzheimer's Disease Father        Surgical History:  Past Surgical History:   Procedure Laterality Date    BREAST RECONSTRUCTION Bilateral     BREAST RECONSTRUCTION Bilateral 02/15/2022     SECTION      LYMPH NODE DISSECTION      MASTECTOMY, BILATERAL Bilateral 10/2020    MASTECTOMY, BILATERAL Bilateral 10/2020    bilateral mastectomy and reconstruction for cancer (L)    TONSILLECTOMY AND ADENOIDECTOMY      TUBAL LIGATION      WISDOM TOOTH EXTRACTION         ROS  Review of Systems   Constitutional:  Negative for appetite change. Respiratory:  Negative for cough and chest tightness.     Cardiovascular:  Negative for chest pain.   Psychiatric/Behavioral:  Positive for decreased concentration and dysphoric mood. Visit Vitals  /75   Pulse 72   Temp 97.4 °F (36.3 °C)   Ht 5' 3\" (1.6 m)   Wt 183 lb 6.4 oz (83.2 kg)   SpO2 98%   BMI 32.49 kg/m²   125/75    Physical Exam  Physical Exam  Constitutional:       Appearance: Normal appearance. HENT:      Head: Normocephalic and atraumatic. Cardiovascular:      Rate and Rhythm: Normal rate and regular rhythm. Heart sounds: Normal heart sounds. Pulmonary:      Effort: Pulmonary effort is normal.      Breath sounds: Normal breath sounds. Neurological:      Mental Status: She is alert. ASSESSMENT & PLAN      I have reviewed the patient's past medical history, social history and family history and vitals. We have discussed treatment plan and follow up and given patient instructions. Patient's questions are answered and we will follow up as indicated. Depression - Increased effexor to 150 mg ER - start tomorrow am. Cont buspar. F/u 6 wks to see if helping.              Araceli Ortez MD

## 2022-12-15 ENCOUNTER — OFFICE VISIT (OUTPATIENT)
Dept: ORTHOPEDIC SURGERY | Age: 57
End: 2022-12-15

## 2022-12-15 DIAGNOSIS — M17.11 PRIMARY OSTEOARTHRITIS OF RIGHT KNEE: Primary | ICD-10-CM

## 2022-12-15 RX ORDER — METHYLPREDNISOLONE ACETATE 40 MG/ML
40 INJECTION, SUSPENSION INTRA-ARTICULAR; INTRALESIONAL; INTRAMUSCULAR; SOFT TISSUE ONCE
Status: COMPLETED | OUTPATIENT
Start: 2022-12-15 | End: 2022-12-15

## 2022-12-15 RX ADMIN — METHYLPREDNISOLONE ACETATE 40 MG: 40 INJECTION, SUSPENSION INTRA-ARTICULAR; INTRALESIONAL; INTRAMUSCULAR; SOFT TISSUE at 13:45

## 2022-12-15 NOTE — PROGRESS NOTES
Patient ID:  Antonella Ortiz  [id number]  22 y.o.  1965    Today: December 15, 2022          Chief Complaint:  Right Knee pain    HPI:       Antonella Ortiz is a 59 y.o. female seen for evaluation and treatment of followup of right knee osteoarthritis. The patient reports pain along the joint lines, reports stiffness of the knee with prolonged inactivity, and swelling/pain at the end of the day and after increased physical activity. Generally, symptoms improve with sitting/rest. The pain affects the patients activities of daily living and quality of life. Patient reports progressive pain and instability in the knee. The pain has been ongoing for an extended period of time. Pain ranges from approximately 4-8 in a cyclical fashion with periods of acute exacerbation. Patient has attempted prior conservative treatment including medications and activity modification. Treatment to date has included OTC medications and activity modification. At one point the patient has had a steroid injection in the right knee which provided 3+ months of pain improvement. Past Medical History:  Past Medical History:   Diagnosis Date    Cancer [facility])     left breast cancer- had mastectomy, chemotherapy, and radiation    Contact dermatitis and other eczema, due to unspecified cause     Depression     Pulmonary embolus, left ([facility] [address] 10/2020       Past Surgical History:  Past Surgical History:   Procedure Laterality Date    BREAST RECONSTRUCTION Bilateral     BREAST RECONSTRUCTION Bilateral 02/15/2022     SECTION      LYMPH NODE DISSECTION      MASTECTOMY, BILATERAL Bilateral 10/2020    MASTECTOMY, BILATERAL Bilateral 10/2020    bilateral mastectomy and reconstruction for cancer (L)    TONSILLECTOMY AND ADENOIDECTOMY      TUBAL LIGATION      WISDOM TOOTH EXTRACTION          Medications:     Prior to Admission medications    Medication Sig Start Date End Date Taking?  Authorizing Provider   venlafaxine (EFFEXOR XR) 150 MG extended release capsule TAKE ONE CAPSULE BY MOUTH EVERY DAY 11/8/22 11/7/23  Velma Voss MD   busPIRone (BUSPAR) 30 MG tablet Take 30 mg by mouth in the morning and at bedtime 11/2/22   Velma Voss MD   calcium carbonate-vitamin D (CALTRATE) 600-400 MG-UNIT TABS per tab Take 1 tablet by mouth 2 times daily    Historical Provider, MD   estradiol (ESTRACE) 0.1 MG/GM vaginal cream Apply fingertip amount to urethral caruncle twice weekly x 2 months thereafter 6/8/22   Otto Murcia PA-C   albuterol sulfate  (90 Base) MCG/ACT inhaler Inhale 1-2 puffs into the lungs every 6 hours as needed 10/23/16   Ar Automatic Reconciliation   anastrozole (ARIMIDEX) 1 MG tablet Take 1 mg by mouth daily 9/21/21   Ar Automatic Reconciliation   docusate (COLACE, DULCOLAX) 100 MG CAPS Take 100 mg by mouth every morning    Ar Automatic Reconciliation   fluticasone (FLONASE) 50 MCG/ACT nasal spray 2 sprays by Nasal route daily    Ar Automatic Reconciliation   ibuprofen (ADVIL;MOTRIN) 800 MG tablet Take 800 mg by mouth every 8 hours as needed 4/25/19   Ar Automatic Reconciliation   loratadine (CLARITIN) 10 MG tablet Take 10 mg by mouth    Ar Automatic Reconciliation   polyethylene glycol (GLYCOLAX) 17 GM/SCOOP powder Take 1 packet by mouth daily as needed  Patient not taking: Reported on 11/8/2022    Ar Automatic Reconciliation       Family History:     Family History   Problem Relation Age of Onset    Cancer Mother     Alzheimer's Disease Father        Social History:      Social History     Tobacco Use    Smoking status: Never    Smokeless tobacco: Never   Substance Use Topics    Alcohol use: Yes     Alcohol/week: 0.0 standard drinks           Allergies:    No Known Allergies     Vitals: There were no vitals taken for this visit. ROS:   Review of Systems         Objective:   General: Patient is awake and in no acute distress  Psych: Mood and affect appropriate  HEENT: Normocephalic. Atramatic.  Pupils equal, round and reactive. Sclera normal.   Neck: Supple without obvious mass   Chest: Symmetric  Lungs:  Breathing non-labored. No tachypnea noted. Abdomen: Soft on gross examination without obvious distention. Neuro: No obvious neurologic deficit. Grossly moves bilateral upper extremities without motor or sensory deficits. No gross weakness noted in the lower extremities. No hyporeflexia or hyperreflexia noted. Vascular: No gross arterial or venous deficiency noted. DP and PT pulses are palpable in the lower extremities  Lymphatic: No lymphedema noted in the lower extremities. Skin: No prior incisions noted about the right knee. No obvious rashes noted about the area. No skin changes noted about the knee or about the adjacent thigh or leg. Extremities:  Patient ambulates with an antalgic gait. There is pain with ROM of the left knee. Range of motion is 0-120. Trace effusion noted in the knee. Patellofemoral crepitus is present. Distally the patient shows no neurologic deficit. Xrays (obtained either today or previously):    Heading: XR Knee 3/4 View  Views: AP knee, skiers PA knee, lateral knee, sunrise view right knee  Clinical indication: Right Knee Pain   Findings: Xrays of the knees obtained today or previously show tricompartmental bone-on-bone arthritic changes with associated osteophyte formation and subchondral sclerosis. Impression: Right Knee osteoarthritis    Taurus Ramirez MD    Assessment:   1. Arthritis of the Right knee    Plan:   Differential diagnosis and treatment options have been discussed with the patient. Risks, benefits and alternatives of each were discussed and patient questions answered. We discussed oral anti-inflammatory medications, activity modifications, physical therapy, corticosteroid injections, weight loss (if appropriate), and surgery. We discussed that given the degenerative nature of the joint that in most cases surgery is the definitive treatment for this condition.  We did discuss some of the details of surgery along with some of the risks, benefits and alternatives. At this point the patient is a candidate for surgery however they would like to try to postpone surgery at this point in time if possible. At this point the patient has failed the aforementioned treatment modalities. At this point the patient would like to proceed with Corticosteroid Injection. We discussed potential risks of steroid injection including but not limited to steroid flare with an associated increase in pain, fat necrosis, skin discoloration around the injection site, temporary increase in blood sugars in diabetic patients and possible facial flushing after injection. TREATMENT:    Steroid Injection - Risks, benefits, and alternatives of corticosteroid injection were discussed. After any questions were address the patient wished to proceed with injection. After prepping the injection site and right knee was injected with 1cc of 40mg Depomedrol/3cc marcaine. Patient tolerated the procedure well. Patient is instructed to ice the joint for next few days if they experience discomfort. The patient will followup as directed or as needed.     Signed By: Briseyda Gilbert MD  December 15, 2022

## 2023-01-09 ENCOUNTER — TELEMEDICINE (OUTPATIENT)
Dept: FAMILY MEDICINE CLINIC | Facility: CLINIC | Age: 58
End: 2023-01-09
Payer: COMMERCIAL

## 2023-01-09 DIAGNOSIS — C50.912 MALIGNANT NEOPLASM OF LEFT FEMALE BREAST, UNSPECIFIED ESTROGEN RECEPTOR STATUS, UNSPECIFIED SITE OF BREAST (HCC): ICD-10-CM

## 2023-01-09 DIAGNOSIS — F33.0 MAJOR DEPRESSIVE DISORDER, RECURRENT, MILD (HCC): ICD-10-CM

## 2023-01-09 DIAGNOSIS — F41.1 GENERALIZED ANXIETY DISORDER: ICD-10-CM

## 2023-01-09 PROCEDURE — 99423 OL DIG E/M SVC 21+ MIN: CPT | Performed by: FAMILY MEDICINE

## 2023-01-09 RX ORDER — PROPRANOLOL HYDROCHLORIDE 20 MG/1
20 TABLET ORAL 2 TIMES DAILY
Qty: 60 TABLET | Refills: 3 | Status: SHIPPED | OUTPATIENT
Start: 2023-01-09

## 2023-01-09 RX ORDER — BUSPIRONE HYDROCHLORIDE 30 MG/1
30 TABLET ORAL 2 TIMES DAILY
Qty: 60 TABLET | Refills: 1 | Status: SHIPPED | OUTPATIENT
Start: 2023-01-09

## 2023-01-09 ASSESSMENT — PATIENT HEALTH QUESTIONNAIRE - PHQ9
5. POOR APPETITE OR OVEREATING: 1
SUM OF ALL RESPONSES TO PHQ QUESTIONS 1-9: 7
6. FEELING BAD ABOUT YOURSELF - OR THAT YOU ARE A FAILURE OR HAVE LET YOURSELF OR YOUR FAMILY DOWN: 1
SUM OF ALL RESPONSES TO PHQ QUESTIONS 1-9: 7
2. FEELING DOWN, DEPRESSED OR HOPELESS: 1
8. MOVING OR SPEAKING SO SLOWLY THAT OTHER PEOPLE COULD HAVE NOTICED. OR THE OPPOSITE, BEING SO FIGETY OR RESTLESS THAT YOU HAVE BEEN MOVING AROUND A LOT MORE THAN USUAL: 0
4. FEELING TIRED OR HAVING LITTLE ENERGY: 1
SUM OF ALL RESPONSES TO PHQ QUESTIONS 1-9: 7
10. IF YOU CHECKED OFF ANY PROBLEMS, HOW DIFFICULT HAVE THESE PROBLEMS MADE IT FOR YOU TO DO YOUR WORK, TAKE CARE OF THINGS AT HOME, OR GET ALONG WITH OTHER PEOPLE: 0
SUM OF ALL RESPONSES TO PHQ QUESTIONS 1-9: 7
3. TROUBLE FALLING OR STAYING ASLEEP: 1
9. THOUGHTS THAT YOU WOULD BE BETTER OFF DEAD, OR OF HURTING YOURSELF: 0
SUM OF ALL RESPONSES TO PHQ9 QUESTIONS 1 & 2: 2
7. TROUBLE CONCENTRATING ON THINGS, SUCH AS READING THE NEWSPAPER OR WATCHING TELEVISION: 1
1. LITTLE INTEREST OR PLEASURE IN DOING THINGS: 1

## 2023-01-09 ASSESSMENT — ANXIETY QUESTIONNAIRES
4. TROUBLE RELAXING: 1
2. NOT BEING ABLE TO STOP OR CONTROL WORRYING: 0
IF YOU CHECKED OFF ANY PROBLEMS ON THIS QUESTIONNAIRE, HOW DIFFICULT HAVE THESE PROBLEMS MADE IT FOR YOU TO DO YOUR WORK, TAKE CARE OF THINGS AT HOME, OR GET ALONG WITH OTHER PEOPLE: SOMEWHAT DIFFICULT
7. FEELING AFRAID AS IF SOMETHING AWFUL MIGHT HAPPEN: 0
3. WORRYING TOO MUCH ABOUT DIFFERENT THINGS: 1
6. BECOMING EASILY ANNOYED OR IRRITABLE: 1
5. BEING SO RESTLESS THAT IT IS HARD TO SIT STILL: 0
1. FEELING NERVOUS, ANXIOUS, OR ON EDGE: 1
GAD7 TOTAL SCORE: 4

## 2023-01-09 ASSESSMENT — ENCOUNTER SYMPTOMS
COUGH: 0
CHEST TIGHTNESS: 0
CONSTIPATION: 0
SHORTNESS OF BREATH: 0
DIARRHEA: 0
ABDOMINAL PAIN: 0

## 2023-01-09 NOTE — PROGRESS NOTES
Nicholas Parmar (:  1965) is a Established patient, here for evaluation of the following: Follow-up on depression/anxiety    Assessment & Plan   Below is the assessment and plan developed based on review of pertinent history, physical exam, labs, studies, and medications. 1. Generalized anxiety disorder -added propranolol to take as needed when she has panic attacks. She is to continue on her BuSpar. She is planning her daughter's wedding and this is causing more anxiety for her. -     propranolol (INDERAL) 20 MG tablet; Take 1 tablet by mouth 2 times daily For heart racing/anxiety, Disp-60 tablet, R-3Normal  -     busPIRone (BUSPAR) 30 MG tablet; Take 30 mg by mouth in the morning and at bedtime, Disp-60 tablet, R-1Normal  2. Major depressive disorder, recurrent, mild ([facility] [address] -she is taking her venlafaxine. She thinks that has helped slightly. 3. Malignant neoplasm of left female breast, unspecified estrogen receptor status, unspecified site of breast ([facility] [address] -she continues on her Arimidex. Return for 3-month follow-up. Subjective   Here for f/u on Depression and anxiety- Virtual visit     Pt last seen in    and effexor was increased. This is a f/u to see medication is working better. Has 2 things going on - work is hard and she is dealing with her daughters wedding plans. Her daughters wedding is upcoming in April. Feeling her heart pounding and has never felt anxiety like this before. Today - had problems at work - threw away a thermometer. She has trouble dealing with doing everything at once. Daughter is helping with the wedding. But the other part of the family is not helping much. They do have a . She feels overwhelmed with everything that needs to be done. She is sleeping at night somewhat-she will awaken in the middle of the night but usually goes back to sleep. She really is not exercising. She is not sure that the increased dose of the Effexor has helped.   We did discuss today trying to take care of herself and to not allow everything to happen at once and to prioritize what needs to be done first.  We discussed that she still has lots of time and ask for help when she needs it. Planning weddings are very difficult especially for the large and especially when she is also trying to work. Patient is very tearful at the encounter today. I did recommend that she try and calm down and try and enjoy the process. Medication Refill  Pertinent negatives include no abdominal pain, chest pain or coughing. Review of Systems   Constitutional: Negative. HENT: Negative. Respiratory:  Negative for cough, chest tightness and shortness of breath. Cardiovascular:  Negative for chest pain, palpitations and leg swelling. Gastrointestinal:  Negative for abdominal pain, constipation and diarrhea. Skin: Negative. Neurological:  Negative for tremors and light-headedness. Hematological: Negative. Psychiatric/Behavioral:  Positive for sleep disturbance. Negative for behavioral problems. The patient is nervous/anxious.          Objective   Patient-Reported Vitals  Patient-Reported Systolic (Top): 126 mmHg  Patient-Reported Diastolic (Bottom): 75 mmHg  BP Observations: Yes, BP was taken on electronic monitoring device with digital readout  Patient-Reported Pulse: 85  Patient-Reported Temperature: 97.5  Patient-Reported Weight: 183lb  Patient-Reported Height: 5 3  Patient-Reported Pulse Oximetry: 95       Physical Exam  [INSTRUCTIONS:  \"[x]\" Indicates a positive item  \"[]\" Indicates a negative item  -- DELETE ALL ITEMS NOT EXAMINED]    Constitutional: [x] Appears well-developed and well-nourished [x] No apparent distress      [] Abnormal -     Mental status: [x] Alert and awake  [x] Oriented to person/place/time [x] Able to follow commands    [] Abnormal -     Eyes:   EOM    [x]  Normal    [] Abnormal -   Sclera  [x]  Normal    [] Abnormal -          Discharge [x] None visible   [] Abnormal -     HENT: [x] Normocephalic, atraumatic  [] Abnormal -   [x] Mouth/Throat: Mucous membranes are moist    External Ears [x] Normal  [] Abnormal -    Neck: [x] No visualized mass [] Abnormal -     Pulmonary/Chest: [x] Respiratory effort normal   [x] No visualized signs of difficulty breathing or respiratory distress        [] Abnormal -      Musculoskeletal:   [x] Normal gait with no signs of ataxia         [x] Normal range of motion of neck        [] Abnormal -     Neurological:        [x] No Facial Asymmetry (Cranial nerve 7 motor function) (limited exam due to video visit)          [x] No gaze palsy        [] Abnormal -          Skin:        [x] No significant exanthematous lesions or discoloration noted on facial skin         [] Abnormal -            Psychiatric:       [x] Normal Affect [] Abnormal -        [x] No Hallucinations    Other pertinent observable physical exam findings:-         On this date 1/9/2023 I have spent 30 minutes reviewing previous notes, test results and face to face (virtual) with the patient discussing the diagnosis and importance of compliance with the treatment plan as well as documenting on the day of the visitSeema Erasmo Peng, was evaluated through a synchronous (real-time) audio-video encounter. The patient (or guardian if applicable) is aware that this is a billable service, which includes applicable co-pays. This Virtual Visit was conducted with patient's (and/or legal guardian's) consent. The visit was conducted pursuant to the emergency declaration under the [address] [place] [address], [address] [place] [address] authority and the [employer] General Act. Patient identification was verified, and a caregiver was present when appropriate. The patient was located at Home: [place] [address]. Provider was located at [facility] (Appt Dept): [facility],  [address] [place] [address]. --Sarahi Diamond MD

## 2023-02-10 ENCOUNTER — TELEPHONE (OUTPATIENT)
Dept: FAMILY MEDICINE CLINIC | Facility: CLINIC | Age: 58
End: 2023-02-10

## 2023-02-10 DIAGNOSIS — U07.1 COVID: Primary | ICD-10-CM

## 2023-02-10 NOTE — TELEPHONE ENCOUNTER
C/o sore throat, congestion - tested positive for covid today. Has not taken anything for it. Has never had it. Did get all injections for covid. Also has cough. No CP, no wheezing, No SOB. No sick contacts. Discussed paxlovid and pt has hx of brca and finished chemo last yr. She is obese. Dicussed rebound possibility with paxlovid. No on statins. Last kidney function normal. No hx of ckd. Discussed otc meds to help symptoms. Tylenol/advil prn also. Drink plenty of fluids.    IF worsening symptoms  -SOB/fever/chills - difficulty breathing -  to go to ED     Paxlovid called to [facility] in [facility]

## 2023-03-16 ENCOUNTER — OFFICE VISIT (OUTPATIENT)
Dept: ORTHOPEDIC SURGERY | Age: 58
End: 2023-03-16

## 2023-03-16 DIAGNOSIS — M17.11 PRIMARY OSTEOARTHRITIS OF RIGHT KNEE: Primary | ICD-10-CM

## 2023-03-16 RX ORDER — METHYLPREDNISOLONE ACETATE 40 MG/ML
40 INJECTION, SUSPENSION INTRA-ARTICULAR; INTRALESIONAL; INTRAMUSCULAR; SOFT TISSUE ONCE
Status: COMPLETED | OUTPATIENT
Start: 2023-03-16 | End: 2023-03-16

## 2023-03-16 RX ADMIN — METHYLPREDNISOLONE ACETATE 40 MG: 40 INJECTION, SUSPENSION INTRA-ARTICULAR; INTRALESIONAL; INTRAMUSCULAR; SOFT TISSUE at 14:14

## 2023-03-16 NOTE — PROGRESS NOTES
Patient ID:  Chriss Decker  [id number]  89 y.o.  1965    Today: 2023          Chief Complaint:  Right Knee pain    HPI:       Chriss Decker is a 59 y.o. female seen for evaluation and treatment of followup of right knee osteoarthritis. The patient reports pain along the joint lines, reports stiffness of the knee with prolonged inactivity, and swelling/pain at the end of the day and after increased physical activity. Generally, symptoms improve with sitting/rest. The pain affects the patients activities of daily living and quality of life. Patient reports progressive pain and instability in the knee. The pain has been ongoing for an extended period of time. Pain ranges from approximately 4-8 in a cyclical fashion with periods of acute exacerbation. Patient has attempted prior conservative treatment including medications and activity modification. Treatment to date has included OTC medications and activity modification. At one point the patient has had a steroid injection in the right knee which provided 3+ months of pain improvement. Past Medical History:  Past Medical History:   Diagnosis Date    Cancer [facility])     left breast cancer- had mastectomy, chemotherapy, and radiation    Contact dermatitis and other eczema, due to unspecified cause     Depression     Pulmonary embolus, left ([facility] [address] 10/2020       Past Surgical History:  Past Surgical History:   Procedure Laterality Date    BREAST RECONSTRUCTION Bilateral     BREAST RECONSTRUCTION Bilateral 02/15/2022     SECTION      LYMPH NODE DISSECTION      MASTECTOMY, BILATERAL Bilateral 10/2020    MASTECTOMY, BILATERAL Bilateral 10/2020    bilateral mastectomy and reconstruction for cancer (L)    TONSILLECTOMY AND ADENOIDECTOMY      TUBAL LIGATION      WISDOM TOOTH EXTRACTION          Medications:     Prior to Admission medications    Medication Sig Start Date End Date Taking?  Authorizing Provider   propranolol (INDERAL) 20 MG tablet Take 1 tablet by mouth 2 times daily For heart racing/anxiety 1/9/23   Ardith Mcardle, MD   busPIRone (BUSPAR) 30 MG tablet Take 30 mg by mouth in the morning and at bedtime 1/9/23   Ardith Mcardle, MD   venlafaxine (EFFEXOR XR) 150 MG extended release capsule TAKE ONE CAPSULE BY MOUTH EVERY DAY 11/8/22 11/7/23  Ardith Mcardle, MD   calcium carbonate-vitamin D (CALTRATE) 600-400 MG-UNIT TABS per tab Take 1 tablet by mouth 2 times daily    Historical Provider, MD   estradiol (ESTRACE) 0.1 MG/GM vaginal cream Apply fingertip amount to urethral caruncle twice weekly x 2 months thereafter 6/8/22   Breann Vargas PA-C   albuterol sulfate  (90 Base) MCG/ACT inhaler Inhale 1-2 puffs into the lungs every 6 hours as needed 10/23/16   Ar Automatic Reconciliation   anastrozole (ARIMIDEX) 1 MG tablet Take 1 mg by mouth daily 9/21/21   Ar Automatic Reconciliation   docusate (COLACE, DULCOLAX) 100 MG CAPS Take 100 mg by mouth every morning    Ar Automatic Reconciliation   fluticasone (FLONASE) 50 MCG/ACT nasal spray 2 sprays by Nasal route daily    Ar Automatic Reconciliation   ibuprofen (ADVIL;MOTRIN) 800 MG tablet Take 800 mg by mouth every 8 hours as needed 4/25/19   Ar Automatic Reconciliation   loratadine (CLARITIN) 10 MG tablet Take 10 mg by mouth    Ar Automatic Reconciliation   polyethylene glycol (GLYCOLAX) 17 GM/SCOOP powder Take 1 packet by mouth daily as needed  Patient not taking: No sig reported    Ar Automatic Reconciliation       Family History:     Family History   Problem Relation Age of Onset    Cancer Mother     Alzheimer's Disease Father        Social History:      Social History     Tobacco Use    Smoking status: Never    Smokeless tobacco: Never   Substance Use Topics    Alcohol use: Yes     Alcohol/week: 0.0 standard drinks           Allergies:    No Known Allergies     Vitals: There were no vitals taken for this visit.     ROS:   Review of Systems         Objective:   General: Patient is awake and in no acute distress  Psych: Mood and affect appropriate  HEENT: Normocephalic. Atramatic. Pupils equal, round and reactive. Sclera normal.   Neck: Supple without obvious mass   Chest: Symmetric  Lungs:  Breathing non-labored. No tachypnea noted. Abdomen: Soft on gross examination without obvious distention. Neuro: No obvious neurologic deficit. Grossly moves bilateral upper extremities without motor or sensory deficits. No gross weakness noted in the lower extremities. No hyporeflexia or hyperreflexia noted. Vascular: No gross arterial or venous deficiency noted. DP and PT pulses are palpable in the lower extremities  Lymphatic: No lymphedema noted in the lower extremities. Skin: No prior incisions noted about the right knee. No obvious rashes noted about the area. No skin changes noted about the knee or about the adjacent thigh or leg. Extremities:  Patient ambulates with an antalgic gait. There is pain with ROM of the left knee. Range of motion is 0-120. Trace effusion noted in the knee. Patellofemoral crepitus is present. Distally the patient shows no neurologic deficit. Xrays (obtained either today or previously):    Heading: XR Knee 3/4 View  Views: AP knee, skiers PA knee, lateral knee, sunrise view right knee  Clinical indication: Right Knee Pain   Findings: Xrays of the knees obtained today or previously show tricompartmental bone-on-bone arthritic changes with associated osteophyte formation and subchondral sclerosis. Impression: Right Knee osteoarthritis    David Valles MD    Assessment:   1. Arthritis of the Right knee    Plan:   Differential diagnosis and treatment options have been discussed with the patient. Risks, benefits and alternatives of each were discussed and patient questions answered. We discussed oral anti-inflammatory medications, activity modifications, physical therapy, corticosteroid injections, weight loss (if appropriate), and surgery.  We discussed that given the degenerative nature of the joint that in most cases surgery is the definitive treatment for this condition. We did discuss some of the details of surgery along with some of the risks, benefits and alternatives. At this point the patient is a candidate for surgery however they would like to try to postpone surgery at this point in time if possible. At this point the patient has failed the aforementioned treatment modalities. At this point the patient would like to proceed with Corticosteroid Injection. We discussed potential risks of steroid injection including but not limited to steroid flare with an associated increase in pain, fat necrosis, skin discoloration around the injection site, temporary increase in blood sugars in diabetic patients and possible facial flushing after injection. TREATMENT:    Steroid Injection - Risks, benefits, and alternatives of corticosteroid injection were discussed. After any questions were address the patient wished to proceed with injection. After prepping the injection site and right knee was injected with 1cc of 40mg Depomedrol/3cc marcaine. Patient tolerated the procedure well. Patient is instructed to ice the joint for next few days if they experience discomfort. The patient will followup as directed or as needed.     Signed By: Rao Ayon MD  March 16, 2023

## 2023-05-24 ENCOUNTER — TELEPHONE (OUTPATIENT)
Dept: FAMILY MEDICINE CLINIC | Facility: CLINIC | Age: 58
End: 2023-05-24

## 2023-05-24 ENCOUNTER — TELEPHONE (OUTPATIENT)
Dept: ORTHOPEDIC SURGERY | Age: 58
End: 2023-05-24

## 2023-05-24 NOTE — TELEPHONE ENCOUNTER
Pt calling stating that she had an episode of \" jagged edges in her vision\" that lasted about 10 minutes today. No other s/s. Pt states that she feels fine. Advised that this could be an aura before a migraine . She denies any retinal tear s/s. Advised to rest and if any furter issues to go to urgent care. Pt agreed.

## 2023-06-01 ENCOUNTER — OFFICE VISIT (OUTPATIENT)
Dept: ORTHOPEDIC SURGERY | Age: 58
End: 2023-06-01

## 2023-06-01 DIAGNOSIS — M17.11 PRIMARY OSTEOARTHRITIS OF RIGHT KNEE: ICD-10-CM

## 2023-06-01 DIAGNOSIS — M25.561 RIGHT KNEE PAIN, UNSPECIFIED CHRONICITY: Primary | ICD-10-CM

## 2023-06-01 RX ORDER — METHYLPREDNISOLONE ACETATE 40 MG/ML
40 INJECTION, SUSPENSION INTRA-ARTICULAR; INTRALESIONAL; INTRAMUSCULAR; SOFT TISSUE ONCE
Status: COMPLETED | OUTPATIENT
Start: 2023-06-01 | End: 2023-06-01

## 2023-06-01 RX ADMIN — METHYLPREDNISOLONE ACETATE 40 MG: 40 INJECTION, SUSPENSION INTRA-ARTICULAR; INTRALESIONAL; INTRAMUSCULAR; SOFT TISSUE at 10:24

## 2023-06-01 NOTE — PROGRESS NOTES
awake and in no acute distress  Psych: Mood and affect appropriate  HEENT: Normocephalic. Atramatic. Pupils equal, round and reactive. Sclera normal.   Neck: Supple without obvious mass   Chest: Symmetric  Lungs:  Breathing non-labored. No tachypnea noted. Abdomen: Soft on gross examination without obvious distention. Neuro: No obvious neurologic deficit. Grossly moves bilateral upper extremities without motor or sensory deficits. No gross weakness noted in the lower extremities. No hyporeflexia or hyperreflexia noted. Vascular: No gross arterial or venous deficiency noted. DP and PT pulses are palpable in the lower extremities  Lymphatic: No lymphedema noted in the lower extremities. Skin: No prior incisions noted about the right knee. No obvious rashes noted about the area. No skin changes noted about the knee or about the adjacent thigh or leg. Extremities:  Patient ambulates with an antalgic gait. There is pain with ROM of the left knee. Range of motion is 0-120. Trace effusion noted in the knee. Patellofemoral crepitus is present. Distally the patient shows no neurologic deficit. Xrays (obtained either today or previously):    Heading: XR Knee 3/4 View  Views: AP knee, skiers PA knee, lateral knee, sunrise view right knee  Clinical indication: Right Knee Pain   Findings: Xrays of the knees obtained today or previously show tricompartmental bone-on-bone arthritic changes with associated osteophyte formation and subchondral sclerosis. Impression: Right Knee osteoarthritis    Kade Rubio MD    Assessment:   1. Arthritis of the Right knee    Plan:   Differential diagnosis and treatment options have been discussed with the patient. Risks, benefits and alternatives of each were discussed and patient questions answered. We discussed oral anti-inflammatory medications, activity modifications, physical therapy, corticosteroid injections, weight loss (if appropriate), and surgery.  We discussed that given

## 2023-07-26 ENCOUNTER — COMMUNITY OUTREACH (OUTPATIENT)
Dept: FAMILY MEDICINE CLINIC | Facility: CLINIC | Age: 58
End: 2023-07-26

## 2023-11-09 ENCOUNTER — OFFICE VISIT (OUTPATIENT)
Dept: ORTHOPEDIC SURGERY | Age: 58
End: 2023-11-09

## 2023-11-09 DIAGNOSIS — M17.11 PRIMARY OSTEOARTHRITIS OF RIGHT KNEE: Primary | ICD-10-CM

## 2023-11-09 RX ORDER — METHYLPREDNISOLONE ACETATE 40 MG/ML
40 INJECTION, SUSPENSION INTRA-ARTICULAR; INTRALESIONAL; INTRAMUSCULAR; SOFT TISSUE ONCE
Status: COMPLETED | OUTPATIENT
Start: 2023-11-09 | End: 2023-11-09

## 2023-11-09 RX ADMIN — METHYLPREDNISOLONE ACETATE 40 MG: 40 INJECTION, SUSPENSION INTRA-ARTICULAR; INTRALESIONAL; INTRAMUSCULAR; SOFT TISSUE at 15:48

## 2023-11-09 NOTE — PROGRESS NOTES
discussed that given the degenerative nature of the joint that in most cases surgery is the definitive treatment for this condition. We did discuss some of the details of surgery along with some of the risks, benefits and alternatives. At this point the patient is a candidate for surgery however they would like to try to postpone surgery at this point in time if possible. At this point the patient has failed the aforementioned treatment modalities and would like to proceed with Corticosteroid Injection. We discussed potential risks of steroid injection including but not limited to steroid flare with an associated increase in pain, fat necrosis, skin discoloration around the injection site, temporary increase in blood sugars in diabetic patients and possible facial flushing after injection. TREATMENT:    Steroid Injection - Risks, benefits, and alternatives of corticosteroid injection were discussed. After any questions were address the patient wished to proceed with injection. After prepping the injection site and right knee was injected with 1cc of 40mg Depomedrol/3cc marcaine. Patient tolerated the procedure well. Patient is instructed to ice the joint for next few days if they experience discomfort. The patient will followup as directed or as needed.     Signed By: Deanne Mao MD  November 9, 2023

## 2024-01-09 ENCOUNTER — TELEPHONE (OUTPATIENT)
Dept: FAMILY MEDICINE CLINIC | Facility: CLINIC | Age: 59
End: 2024-01-09

## 2024-01-09 NOTE — TELEPHONE ENCOUNTER
Patient notified Wegovy RX was sent to [facility] pharmacy & per pharmacist it is there

## 2024-01-19 DIAGNOSIS — L30.9 DERMATITIS: ICD-10-CM

## 2024-01-19 DIAGNOSIS — B35.1 ONYCHOMYCOSIS: ICD-10-CM

## 2024-01-19 RX ORDER — TRIAMCINOLONE ACETONIDE 0.25 MG/G
CREAM TOPICAL
Qty: 15 G | Refills: 0 | Status: SHIPPED | OUTPATIENT
Start: 2024-01-19

## 2024-01-26 ENCOUNTER — TELEPHONE (OUTPATIENT)
Dept: FAMILY MEDICINE CLINIC | Facility: CLINIC | Age: 59
End: 2024-01-26

## 2024-01-26 NOTE — TELEPHONE ENCOUNTER
Told Abdifatah patient needs to be seen for vaginal lesion & then possible referral. She will call back to schedule an appt

## 2024-02-09 ENCOUNTER — OFFICE VISIT (OUTPATIENT)
Dept: FAMILY MEDICINE CLINIC | Facility: CLINIC | Age: 59
End: 2024-02-09
Payer: COMMERCIAL

## 2024-02-09 VITALS
DIASTOLIC BLOOD PRESSURE: 68 MMHG | WEIGHT: 196.38 LBS | BODY MASS INDEX: 34.8 KG/M2 | SYSTOLIC BLOOD PRESSURE: 102 MMHG | HEART RATE: 76 BPM | OXYGEN SATURATION: 99 % | TEMPERATURE: 97.7 F | HEIGHT: 63 IN

## 2024-02-09 DIAGNOSIS — N36.2 URETHRAL CARUNCLE: Primary | ICD-10-CM

## 2024-02-09 DIAGNOSIS — N94.19 DYSPAREUNIA DUE TO MEDICAL CONDITION IN FEMALE: ICD-10-CM

## 2024-02-09 PROCEDURE — 99214 OFFICE O/P EST MOD 30 MIN: CPT | Performed by: PHYSICIAN ASSISTANT

## 2024-02-09 RX ORDER — SEMAGLUTIDE 0.25 MG/.5ML
0.25 INJECTION, SOLUTION SUBCUTANEOUS
Qty: 2 ML | Refills: 0 | Status: SHIPPED | OUTPATIENT
Start: 2024-02-09

## 2024-02-09 RX ORDER — SEMAGLUTIDE 1.34 MG/ML
INJECTION, SOLUTION SUBCUTANEOUS
Qty: 1 ADJUSTABLE DOSE PRE-FILLED PEN SYRINGE | Refills: 0 | Status: SHIPPED | COMMUNITY
Start: 2024-02-09

## 2024-02-09 RX ORDER — BUPROPION HYDROCHLORIDE 150 MG/1
150 TABLET, EXTENDED RELEASE ORAL DAILY
COMMUNITY
Start: 2024-01-29

## 2024-02-09 ASSESSMENT — PATIENT HEALTH QUESTIONNAIRE - PHQ9
SUM OF ALL RESPONSES TO PHQ QUESTIONS 1-9: 2
2. FEELING DOWN, DEPRESSED OR HOPELESS: 1
1. LITTLE INTEREST OR PLEASURE IN DOING THINGS: 1
SUM OF ALL RESPONSES TO PHQ QUESTIONS 1-9: 2
SUM OF ALL RESPONSES TO PHQ9 QUESTIONS 1 & 2: 2
SUM OF ALL RESPONSES TO PHQ QUESTIONS 1-9: 2
SUM OF ALL RESPONSES TO PHQ QUESTIONS 1-9: 2

## 2024-02-09 NOTE — PROGRESS NOTES
kg (196 lb 6 oz), SpO2 99 %.  Body mass index is 34.79 kg/m².      Physical Exam  Vitals reviewed.   Constitutional:       Appearance: Normal appearance. She is obese.   HENT:      Head: Normocephalic and atraumatic.      Right Ear: External ear normal.      Left Ear: External ear normal.   Eyes:      Conjunctiva/sclera: Conjunctivae normal.   Genitourinary:     General: Normal vulva.      Labia:         Right: No rash, lesion or injury.         Left: No rash, lesion or injury.       Urethra: Urethral pain and urethral lesion present.      Cervix: Normal.      Comments: Urethral caruncle that is TTP and reproduces her pain  Atrophic vaginal tissue  Lymphadenopathy:      Lower Body: No right inguinal adenopathy. No left inguinal adenopathy.   Neurological:      Mental Status: She is alert.   Psychiatric:         Attention and Perception: Attention normal.         Mood and Affect: Mood is anxious.         Behavior: Behavior normal.          ASSESSMENT AND PLAN:  Nika was seen today for urethral caruncle.    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  -     Semaglutide-Weight Management (WEGOVY) 0.25 MG/0.5ML SOAJ SC injection; Inject 0.25 mg into the skin every 7 days Call monthly to have dose adjusted     Will refer her back to Dr. Ward as she evaluated her in 2022 for her urethral caruncle.   Health problems associated with obesity, including CVD, type 2 DM, SHAI, FELDER, arthritis, increased risk for certain cancers.  I recommended a 5767-5409 calorie diet with emphasis on

## 2024-02-19 ENCOUNTER — CLINICAL DOCUMENTATION (OUTPATIENT)
Dept: FAMILY MEDICINE CLINIC | Facility: CLINIC | Age: 59
End: 2024-02-19

## 2024-02-19 DIAGNOSIS — L30.9 DERMATITIS: ICD-10-CM

## 2024-02-19 RX ORDER — TRIAMCINOLONE ACETONIDE 0.25 MG/G
CREAM TOPICAL
Qty: 15 G | Refills: 1 | Status: SHIPPED | OUTPATIENT
Start: 2024-02-19

## 2024-02-22 ENCOUNTER — CLINICAL DOCUMENTATION (OUTPATIENT)
Dept: FAMILY MEDICINE CLINIC | Facility: CLINIC | Age: 59
End: 2024-02-22

## 2024-03-28 ENCOUNTER — OFFICE VISIT (OUTPATIENT)
Dept: ORTHOPEDIC SURGERY | Age: 59
End: 2024-03-28
Payer: COMMERCIAL

## 2024-03-28 DIAGNOSIS — M17.11 PRIMARY OSTEOARTHRITIS OF RIGHT KNEE: Primary | ICD-10-CM

## 2024-03-28 PROCEDURE — 20610 DRAIN/INJ JOINT/BURSA W/O US: CPT | Performed by: ORTHOPAEDIC SURGERY

## 2024-03-28 PROCEDURE — 99214 OFFICE O/P EST MOD 30 MIN: CPT | Performed by: ORTHOPAEDIC SURGERY

## 2024-03-28 RX ORDER — METHYLPREDNISOLONE ACETATE 40 MG/ML
40 INJECTION, SUSPENSION INTRA-ARTICULAR; INTRALESIONAL; INTRAMUSCULAR; SOFT TISSUE ONCE
Status: COMPLETED | OUTPATIENT
Start: 2024-03-28 | End: 2024-03-28

## 2024-03-28 RX ADMIN — METHYLPREDNISOLONE ACETATE 40 MG: 40 INJECTION, SUSPENSION INTRA-ARTICULAR; INTRALESIONAL; INTRAMUSCULAR; SOFT TISSUE at 15:22

## 2024-03-28 NOTE — PROGRESS NOTES
Patient ID:  Nika Vu  [id number]  58 y.o.  1965    Today: 2024          Chief Complaint:  Right Knee pain    HPI:       Nika Vu is a 58 y.o. female seen for evaluation and treatment of followup of right knee osteoarthritis. The patient reports pain along the joint lines, reports stiffness of the knee with prolonged inactivity, and swelling/pain at the end of the day and after increased physical activity. Generally, symptoms improve with sitting/rest. The pain affects the patient’s activities of daily living and quality of life. Patient reports progressive pain and instability in the knee. The pain has been ongoing for an extended period of time. Pain ranges from approximately 4-8 in a cyclical fashion with periods of acute exacerbation.     Patient has attempted prior conservative treatment including medications and activity modification.    Treatment to date has included OTC medications and activity modification. At one point the patient has had a steroid injection in the right knee which provided 3+ months of pain improvement.    Past Medical History:  Past Medical History:   Diagnosis Date    Anxiety     Cancer (HCC)     left breast cancer- had mastectomy, chemotherapy, and radiation    Contact dermatitis and other eczema, due to unspecified cause     Depression     Osteoarthritis 10/2021    Pulmonary embolus, left (HCC) 10/2020       Past Surgical History:  Past Surgical History:   Procedure Laterality Date    BREAST RECONSTRUCTION Bilateral     BREAST RECONSTRUCTION Bilateral 02/15/2022     SECTION      LYMPH NODE DISSECTION      MASTECTOMY, BILATERAL Bilateral 10/2020    MASTECTOMY, BILATERAL Bilateral 10/2020    bilateral mastectomy and reconstruction for cancer (L)    SKIN CANCER EXCISION  2023    basal cell cancer form scalp    TONSILLECTOMY AND ADENOIDECTOMY      TUBAL LIGATION      WISDOM TOOTH EXTRACTION          Medications:     Prior to Admission

## 2024-05-23 ENCOUNTER — TELEPHONE (OUTPATIENT)
Dept: ORTHOPEDIC SURGERY | Age: 59
End: 2024-05-23

## 2024-05-29 ENCOUNTER — OFFICE VISIT (OUTPATIENT)
Dept: UROGYNECOLOGY | Age: 59
End: 2024-05-29
Payer: COMMERCIAL

## 2024-05-29 DIAGNOSIS — N94.10 PAIN IN FEMALE GENITALIA ON INTERCOURSE: ICD-10-CM

## 2024-05-29 DIAGNOSIS — N36.2 URETHRAL CARUNCLE: Primary | ICD-10-CM

## 2024-05-29 PROCEDURE — 99213 OFFICE O/P EST LOW 20 MIN: CPT | Performed by: OBSTETRICS & GYNECOLOGY

## 2024-05-29 PROCEDURE — 99459 PELVIC EXAMINATION: CPT | Performed by: OBSTETRICS & GYNECOLOGY

## 2024-05-29 RX ORDER — VENLAFAXINE HYDROCHLORIDE 75 MG/1
75 TABLET, EXTENDED RELEASE ORAL
COMMUNITY
Start: 2024-05-26

## 2024-05-29 NOTE — PROGRESS NOTES
[facility]  [address]  [address]  [place] [address]  Dept: [phone]    PCP:  More Oropeza PA-C    5/29/2024      HPI:  Nika Vu is here to follow up on Follow-up  .  Ms Nika Vu has a urethral caruncle and she would like to discuss surgery options. She reports it will occasionally cause pain with intercourse. Last seen 9/7/22.      No results found for this visit on 05/29/24.    There were no vitals taken for this visit.      Female Genitourinary: This includes 4 minutes of clinical staff time associated with chaperoning a pelvic exam.    Vulva:    Normal. No lesions  Bartholin's Gland:  Bilateral , Normal, nontender  Skenes Gland:  Bilateral, Normal, nontender   Clitoris:  Normal.   Introitus:    Normal.   Urethral Meatus:  Normal appearing, normal size, no lesions, no prolapse, carulncle is well healed  Urethra:  No masses, no tenderness  Vagina:  No atrophy, no discharge, no lesions  Cervix:  No lesions, no discharge  Uterus:  No tenderness, normal mobility   Adnexa:   No masses palpated, no tenderness  Bladder:  No tenderness, no masses palpated  Perineum:  Normal, no lesions    Rectal   Anorectal Exam: No hemorrhoids and no masses or lesions of the perineum    Pelvic floor contractions: 4/5      Not able to iliicit the pain on exam today. She states its with deep penetration.     1. Urethral caruncle  Assessment & Plan:  This looks good. She is using estrogen 1 time per week.      Her pain is with deep penetration during intercourse. I do not think this is the cause.   2. Pain in female genitalia on intercourse  Assessment & Plan:  Deep penetration seems to bother her. I advised her to try positions where she has control of the depth of penetration (she is on top). I advised her to utilize foreplay and lubrication as well.     She will report back if the pain continues.     I also offered her PFPT. She declines at this time.       No follow-ups on file.    On this

## 2024-05-29 NOTE — ASSESSMENT & PLAN NOTE
Deep penetration seems to bother her. I advised her to try positions where she has control of the depth of penetration (she is on top). I advised her to utilize foreplay and lubrication as well.     She will report back if the pain continues.     I also offered her PFPT. She declines at this time.

## 2024-05-29 NOTE — ASSESSMENT & PLAN NOTE
This looks good. She is using estrogen 1 time per week.      Her pain is with deep penetration during intercourse. I do not think this is the cause.

## 2024-06-14 ENCOUNTER — OFFICE VISIT (OUTPATIENT)
Dept: ORTHOPEDIC SURGERY | Age: 59
End: 2024-06-14

## 2024-06-14 DIAGNOSIS — M17.11 PRIMARY OSTEOARTHRITIS OF RIGHT KNEE: Primary | ICD-10-CM

## 2024-06-14 RX ORDER — METHYLPREDNISOLONE ACETATE 40 MG/ML
40 INJECTION, SUSPENSION INTRA-ARTICULAR; INTRALESIONAL; INTRAMUSCULAR; SOFT TISSUE ONCE
Status: COMPLETED | OUTPATIENT
Start: 2024-06-14 | End: 2024-06-14

## 2024-06-14 RX ADMIN — METHYLPREDNISOLONE ACETATE 40 MG: 40 INJECTION, SUSPENSION INTRA-ARTICULAR; INTRALESIONAL; INTRAMUSCULAR; SOFT TISSUE at 09:58

## 2024-06-14 NOTE — PROGRESS NOTES
Patient ID:  Nika Vu  [id number]  59 y.o.  1965    Today: 2024          Chief Complaint:  Right Knee pain    HPI:       Nika Vu is a 59 y.o. female seen for evaluation and treatment of followup of right knee osteoarthritis. The patient reports pain along the joint lines, reports stiffness of the knee with prolonged inactivity, and swelling/pain at the end of the day and after increased physical activity. Generally, symptoms improve with sitting/rest. The pain affects the patient’s activities of daily living and quality of life. Patient reports progressive pain and instability in the knee. The pain has been ongoing for an extended period of time. Pain ranges from approximately 4-8 in a cyclical fashion with periods of acute exacerbation.     Patient has attempted prior conservative treatment including medications and activity modification.    Treatment to date has included OTC medications and activity modification. At one point the patient has had a steroid injection in the right knee which provided 3+ months of pain improvement.    Past Medical History:  Past Medical History:   Diagnosis Date    Anxiety     Cancer (HCC)     left breast cancer- had mastectomy, chemotherapy, and radiation    Contact dermatitis and other eczema, due to unspecified cause     Depression     Osteoarthritis 10/2021    Pulmonary embolus, left (HCC) 10/2020       Past Surgical History:  Past Surgical History:   Procedure Laterality Date    BREAST RECONSTRUCTION Bilateral     BREAST RECONSTRUCTION Bilateral 02/15/2022     SECTION      LYMPH NODE DISSECTION      MASTECTOMY, BILATERAL Bilateral 10/2020    MASTECTOMY, BILATERAL Bilateral 10/2020    bilateral mastectomy and reconstruction for cancer (L)    SKIN CANCER EXCISION  2023    basal cell cancer form scalp    TONSILLECTOMY AND ADENOIDECTOMY      TUBAL LIGATION      WISDOM TOOTH EXTRACTION          Medications:     Prior to Admission

## 2024-07-19 DIAGNOSIS — N36.2 URETHRAL CARUNCLE: ICD-10-CM

## 2024-07-19 RX ORDER — ESTRADIOL 0.1 MG/G
CREAM VAGINAL
Qty: 42.5 G | Refills: 1 | OUTPATIENT
Start: 2024-07-19

## 2024-09-03 ENCOUNTER — OFFICE VISIT (OUTPATIENT)
Dept: FAMILY MEDICINE CLINIC | Facility: CLINIC | Age: 59
End: 2024-09-03
Payer: COMMERCIAL

## 2024-09-03 VITALS
OXYGEN SATURATION: 97 % | BODY MASS INDEX: 35.08 KG/M2 | DIASTOLIC BLOOD PRESSURE: 77 MMHG | TEMPERATURE: 97.2 F | WEIGHT: 198 LBS | SYSTOLIC BLOOD PRESSURE: 122 MMHG | HEIGHT: 63 IN | HEART RATE: 83 BPM

## 2024-09-03 DIAGNOSIS — N36.2 URETHRAL CARUNCLE: Primary | ICD-10-CM

## 2024-09-03 DIAGNOSIS — I83.813 VARICOSE VEINS OF BOTH LOWER EXTREMITIES WITH PAIN: ICD-10-CM

## 2024-09-03 PROCEDURE — 99214 OFFICE O/P EST MOD 30 MIN: CPT | Performed by: PHYSICIAN ASSISTANT

## 2024-09-03 RX ORDER — TOPIRAMATE 100 MG/1
100 TABLET, FILM COATED ORAL 2 TIMES DAILY
COMMUNITY
Start: 2024-07-11

## 2024-09-03 RX ORDER — ESTRADIOL 0.1 MG/G
CREAM VAGINAL
Qty: 42.5 G | Refills: 5 | Status: SHIPPED | OUTPATIENT
Start: 2024-09-03

## 2024-09-03 ASSESSMENT — ENCOUNTER SYMPTOMS: RESPIRATORY NEGATIVE: 1

## 2024-09-03 ASSESSMENT — PATIENT HEALTH QUESTIONNAIRE - PHQ9
SUM OF ALL RESPONSES TO PHQ QUESTIONS 1-9: 0
SUM OF ALL RESPONSES TO PHQ9 QUESTIONS 1 & 2: 0
SUM OF ALL RESPONSES TO PHQ QUESTIONS 1-9: 0
2. FEELING DOWN, DEPRESSED OR HOPELESS: NOT AT ALL
1. LITTLE INTEREST OR PLEASURE IN DOING THINGS: NOT AT ALL
SUM OF ALL RESPONSES TO PHQ QUESTIONS 1-9: 0
SUM OF ALL RESPONSES TO PHQ QUESTIONS 1-9: 0

## 2024-09-03 NOTE — PROGRESS NOTES
Health, [facility]    Housing Stability     Was there a time when you did not have a steady place to sleep: Not asked     Worried that the place you are staying is making you sick: Not asked     Past Surgical History:   Procedure Laterality Date    BREAST RECONSTRUCTION Bilateral     BREAST RECONSTRUCTION Bilateral 02/15/2022     SECTION      LYMPH NODE DISSECTION      MASTECTOMY, BILATERAL Bilateral 10/2020    MASTECTOMY, BILATERAL Bilateral 10/2020    bilateral mastectomy and reconstruction for cancer (L)    SKIN CANCER EXCISION  2023    basal cell cancer form scalp    TONSILLECTOMY AND ADENOIDECTOMY      TUBAL LIGATION      WISDOM TOOTH EXTRACTION         Family History   Problem Relation Age of Onset    Cancer Mother     Arthritis Mother     Alzheimer's Disease Father      Review of Systems   Respiratory: Negative.     Cardiovascular: Negative.    Genitourinary:  Positive for dyspareunia. Negative for dysuria and hematuria.   Musculoskeletal: Negative.    Neurological:  Negative for numbness.         VITAL SIGNS:  Blood pressure 122/77, pulse 83, temperature 97.2 °F (36.2 °C), temperature source Temporal, height 1.6 m (5' 3\"), weight 89.8 kg (198 lb), SpO2 97%.  Body mass index is 35.07 kg/m².    Physical Exam  Vitals reviewed.   Constitutional:       Appearance: Normal appearance. She is obese.   HENT:      Head: Normocephalic and atraumatic.      Right Ear: External ear normal.      Left Ear: External ear normal.   Eyes:      Conjunctiva/sclera: Conjunctivae normal.   Cardiovascular:      Pulses:           Dorsalis pedis pulses are 1+ on the right side and 1+ on the left side.        Posterior tibial pulses are 1+ on the right side and 1+ on the left side.      Comments: Varicose veins of both lower extremities. Cluster of spider veins on left shin that is TTP  Pulmonary:      Effort: Pulmonary effort is normal.   Neurological:      Mental Status: She is alert and oriented to person,

## 2024-12-23 ENCOUNTER — OFFICE VISIT (OUTPATIENT)
Dept: ORTHOPEDIC SURGERY | Age: 59
End: 2024-12-23

## 2024-12-23 DIAGNOSIS — M17.11 PRIMARY OSTEOARTHRITIS OF RIGHT KNEE: Primary | ICD-10-CM

## 2024-12-23 RX ORDER — METHYLPREDNISOLONE ACETATE 40 MG/ML
40 INJECTION, SUSPENSION INTRA-ARTICULAR; INTRALESIONAL; INTRAMUSCULAR; SOFT TISSUE ONCE
Status: SHIPPED | OUTPATIENT
Start: 2024-12-23

## 2024-12-23 NOTE — PATIENT INSTRUCTIONS
Learning About Joint Injections  What are joint injections?     Joint injections are shots into a joint, such as the knee or shoulder. They are used to put in medicines, such as pain relievers and steroid medicines. Steroids can help reduce inflammation. A steroid shot can sometimes help with short-term pain relief when other treatments haven't worked.  How are they done?  First, the area over the joint will be cleaned. Your doctor may then use a tiny needle to numb the skin in the area where you will get the joint injection.  If a tiny needle is used to numb the area, your doctor will use another needle to inject the medicine. Your doctor may use a pain reliever, a steroid, or both. You may feel some pressure or discomfort.  Your doctor may put ice on the area before you go home.  What can you expect after a joint injection?  You will probably go home soon after your shot. You may have numbness around the joint for a few hours.  If your shot included both a pain reliever and a steroid, then the pain will probably go away right away. But it might come back after a few hours. This might happen if the pain reliever wears off and the steroid hasn't started to work yet. Steroids don't always work. But when they do, the pain relief can last for several days to a few months or longer.  Your doctor may tell you to use ice on the area. You can also use ice if the pain comes back. Put ice or a cold pack on your joint for 10 to 20 minutes at a time. Put a thin cloth between the ice and your skin.  Follow your doctor's instructions carefully.  Follow-up care is a key part of your treatment and safety. Be sure to make and go to all appointments, and call your doctor if you are having problems. It's also a good idea to know your test results and keep a list of the medicines you take.  Current as of: July 17, 2023  Content Version: 14.2  © 2024 [employer].   Care instructions adapted under license by [facility]

## 2024-12-23 NOTE — PROGRESS NOTES
Patient ID:  Nika Vu  [id number]  59 y.o.  1965    Today: 2024          Chief Complaint:  Right Knee pain    HPI:       Nika Vu is a 59 y.o. female seen for evaluation and treatment of followup of right knee osteoarthritis. The patient reports pain along the joint lines, reports stiffness of the knee with prolonged inactivity, and swelling/pain at the end of the day and after increased physical activity. Generally, symptoms improve with sitting/rest. The pain affects the patient’s activities of daily living and quality of life. Patient reports progressive pain and instability in the knee. The pain has been ongoing for an extended period of time. Pain ranges from approximately 4-8 in a cyclical fashion with periods of acute exacerbation.     Patient has attempted prior conservative treatment including medications and activity modification.    Treatment to date has included OTC medications and activity modification. At one point the patient has had a steroid injection in the right knee which provided 3+ months of pain improvement.    Past Medical History:  Past Medical History:   Diagnosis Date    Anxiety     Cancer (HCC)     left breast cancer- had mastectomy, chemotherapy, and radiation    Contact dermatitis and other eczema, due to unspecified cause     Depression     Osteoarthritis 10/2021    Pulmonary embolus, left (HCC) 10/2020       Past Surgical History:  Past Surgical History:   Procedure Laterality Date    BREAST RECONSTRUCTION Bilateral     BREAST RECONSTRUCTION Bilateral 02/15/2022     SECTION      LYMPH NODE DISSECTION      MASTECTOMY, BILATERAL Bilateral 10/2020    MASTECTOMY, BILATERAL Bilateral 10/2020    bilateral mastectomy and reconstruction for cancer (L)    SKIN CANCER EXCISION  2023    basal cell cancer form scalp    TONSILLECTOMY AND ADENOIDECTOMY      TUBAL LIGATION      WISDOM TOOTH EXTRACTION          Medications:     Prior to Admission

## 2025-05-28 ENCOUNTER — OFFICE VISIT (OUTPATIENT)
Dept: ORTHOPEDIC SURGERY | Age: 60
End: 2025-05-28
Payer: COMMERCIAL

## 2025-05-28 DIAGNOSIS — M17.11 PRIMARY OSTEOARTHRITIS OF RIGHT KNEE: Primary | ICD-10-CM

## 2025-05-28 PROCEDURE — 20610 DRAIN/INJ JOINT/BURSA W/O US: CPT | Performed by: NURSE PRACTITIONER

## 2025-05-28 PROCEDURE — 99214 OFFICE O/P EST MOD 30 MIN: CPT | Performed by: NURSE PRACTITIONER

## 2025-05-28 RX ORDER — METHYLPREDNISOLONE ACETATE 40 MG/ML
40 INJECTION, SUSPENSION INTRA-ARTICULAR; INTRALESIONAL; INTRAMUSCULAR; SOFT TISSUE ONCE
Status: COMPLETED | OUTPATIENT
Start: 2025-05-28 | End: 2025-05-28

## 2025-05-28 RX ADMIN — METHYLPREDNISOLONE ACETATE 40 MG: 40 INJECTION, SUSPENSION INTRA-ARTICULAR; INTRALESIONAL; INTRAMUSCULAR; SOFT TISSUE at 08:53

## 2025-05-28 NOTE — PROGRESS NOTES
medications    Medication Sig Start Date End Date Taking? Authorizing Provider   estradiol (ESTRACE) 0.1 MG/GM vaginal cream Apply fingertip amount to urethral caruncle twice weekly 9/3/24   More Oropeza PA-C   topiramate (TOPAMAX) 100 MG tablet Take 1 tablet by mouth 2 times daily 7/11/24   Bossman Flores MD   venlafaxine 75 MG extended release tablet 1 tablet 5/26/24   Bossman Flores MD   buPROPion (WELLBUTRIN SR) 150 MG extended release tablet Take 1 tablet by mouth daily 1/29/24   Bossman Flores MD   VRAYLAR 3 MG CAPS capsule Take 1 capsule by mouth daily 11/30/23   Bossman Flores MD   propranolol (INDERAL) 20 MG tablet Take 1 tablet by mouth 2 times daily For heart racing/anxiety 1/9/23   Niki Tracy MD   busPIRone (BUSPAR) 30 MG tablet Take 30 mg by mouth in the morning and at bedtime 1/9/23   Niki Tracy MD   calcium carbonate-vitamin D (CALTRATE) 600-400 MG-UNIT TABS per tab Take 1 tablet by mouth 2 times daily    Bossman Flores MD   anastrozole (ARIMIDEX) 1 MG tablet Take 1 tablet by mouth daily 9/21/21   Automatic Reconciliation, Ar   fluticasone (FLONASE) 50 MCG/ACT nasal spray 2 sprays by Nasal route daily    Automatic Reconciliation, Ar   loratadine (CLARITIN) 10 MG tablet Take 1 tablet by mouth    Automatic Reconciliation, Ar       Family History:     Family History   Problem Relation Age of Onset    Cancer Mother     Arthritis Mother     Alzheimer's Disease Father        Social History:      Social History     Tobacco Use    Smoking status: Never    Smokeless tobacco: Never   Substance Use Topics    Alcohol use: Yes     Alcohol/week: 4.0 standard drinks of alcohol     Types: 4 Cans of beer per week         Allergies:    No Known Allergies     Vitals:   There were no vitals taken for this visit.    ROS:   Review of Systems         Objective:   General: Patient is awake and in no acute distress  Psych: Mood and affect appropriate  HEENT: Normocephalic.

## 2025-05-28 NOTE — PATIENT INSTRUCTIONS
Learning About Joint Injections  What are joint injections?     Joint injections are shots into a joint, such as the knee or shoulder. They are used to put in medicines, such as pain relievers and steroid medicines. Steroids can help reduce inflammation. A steroid shot can sometimes help with short-term pain relief when other treatments haven't worked.  How are they done?  First, the area over the joint will be cleaned. Your doctor may then use a tiny needle to numb the skin in the area where you will get the joint injection.  If a tiny needle is used to numb the area, your doctor will use another needle to inject the medicine. Your doctor may use a pain reliever, a steroid, or both. You may feel some pressure or discomfort.  Your doctor may put ice on the area before you go home.  What can you expect after a joint injection?  You will probably go home soon after your shot. You may have numbness around the joint for a few hours.  If your shot included both a pain reliever and a steroid, then the pain will probably go away right away. But it might come back after a few hours. This might happen if the pain reliever wears off and the steroid hasn't started to work yet. Steroids don't always work. But when they do, the pain relief can last for several days to a few months or longer.  Your doctor may tell you to use ice on the area. You can also use ice if the pain comes back. Put ice or a cold pack on your joint for 10 to 20 minutes at a time. Put a thin cloth between the ice and your skin.  Follow your doctor's instructions carefully.  Follow-up care is a key part of your treatment and safety. Be sure to make and go to all appointments, and call your doctor if you are having problems. It's also a good idea to know your test results and keep a list of the medicines you take.  Current as of: July 31, 2024  Content Version: 14.4  © 2024-2025 [employer].   Care instructions adapted under license by [facility]

## 2025-06-12 ENCOUNTER — OFFICE VISIT (OUTPATIENT)
Dept: ORTHOPEDIC SURGERY | Age: 60
End: 2025-06-12

## 2025-06-12 DIAGNOSIS — M17.11 PRIMARY OSTEOARTHRITIS OF RIGHT KNEE: Primary | ICD-10-CM

## 2025-06-12 NOTE — PROGRESS NOTES
Diagnosis: Right Knee Arthritis    Patient present today for the 1st viscosupplimentation injection in the right knee. Prior injection was tolerated well.    Risks, benefits, and alteratives were discussed with patient prior to injection. A timeout was performed which included identifying the patient by name and date of birth, verifying correct procedure and correct site(s).      Under sterile technique, the right knee was injected with Euflexxa (2ml).  The patient tolerated the procedure well.    Patient is advised to ice the knee over the next week or so.  Continue taking prior oral medications. The patient can follow-up as directed for the 2nd injection in the series.          BHAVANI PEACOCK, APRN - CNP

## 2025-06-19 ENCOUNTER — OFFICE VISIT (OUTPATIENT)
Dept: ORTHOPEDIC SURGERY | Age: 60
End: 2025-06-19

## 2025-06-19 DIAGNOSIS — M17.11 PRIMARY OSTEOARTHRITIS OF RIGHT KNEE: Primary | ICD-10-CM

## 2025-06-19 NOTE — PROGRESS NOTES
Diagnosis: Right Knee Arthritis    Patient present today for the 2nd viscosupplimentation injection in the right knee. Prior injection was tolerated well.    Risks, benefits, and alteratives were discussed with patient prior to injection. A timeout was performed which included identifying the patient by name and date of birth, verifying correct procedure and correct site(s).      Under sterile technique, the right knee was injected with Euflexxa (2ml).  The patient tolerated the procedure well.    Patient is advised to ice the knee over the next week or so.  Continue taking prior oral medications. The patient can follow-up as directed for the final injection in the series.          BHAVANI PEACOCK, APRN - CNP

## 2025-06-27 ENCOUNTER — OFFICE VISIT (OUTPATIENT)
Dept: ORTHOPEDIC SURGERY | Age: 60
End: 2025-06-27

## 2025-06-27 DIAGNOSIS — M17.11 PRIMARY OSTEOARTHRITIS OF RIGHT KNEE: Primary | ICD-10-CM

## 2025-06-27 NOTE — PROGRESS NOTES
Diagnosis: Right Knee arthritis    Patient present today for the 3rd viscosupplimentation injection in the right knee. Prior injection was tolerated well.    Risks, benefits, and alteratives were discussed with patient prior to injection.  A timeout was performed which included identifying the patient by name and date of birth, verifying correct procedure and correct site(s).     Under sterile technique, the right knee was injected with Euflexxa (2ml).  The patient tolerated the procedure well.    Patient is advised to ice the knee over the next week or so.  Continue taking prior oral medications. The patient can follow-up as directed for the final injection in the series.          BHAVANI PEACOCK, APRN - CNP